# Patient Record
Sex: MALE | Race: WHITE | NOT HISPANIC OR LATINO | URBAN - NONMETROPOLITAN AREA
[De-identification: names, ages, dates, MRNs, and addresses within clinical notes are randomized per-mention and may not be internally consistent; named-entity substitution may affect disease eponyms.]

---

## 2019-06-13 ENCOUNTER — OFFICE VISIT (OUTPATIENT)
Dept: UROLOGY | Facility: CLINIC | Age: 49
End: 2019-06-13

## 2019-06-13 VITALS — HEIGHT: 72 IN | WEIGHT: 315 LBS | BODY MASS INDEX: 42.66 KG/M2

## 2019-06-13 DIAGNOSIS — N62 PSEUDOGYNECOMASTIA: ICD-10-CM

## 2019-06-13 DIAGNOSIS — E28.0 HYPERESTROGENISM: ICD-10-CM

## 2019-06-13 DIAGNOSIS — R79.89 LOW TESTOSTERONE IN MALE: Primary | ICD-10-CM

## 2019-06-13 PROCEDURE — 99204 OFFICE O/P NEW MOD 45 MIN: CPT | Performed by: UROLOGY

## 2019-06-13 RX ORDER — ANASTROZOLE 1 MG/1
1 TABLET ORAL WEEKLY
Qty: 12 TABLET | Refills: 3 | Status: SHIPPED | OUTPATIENT
Start: 2019-06-13 | End: 2022-02-16

## 2019-06-13 RX ORDER — TESTOSTERONE CYPIONATE 200 MG/ML
INJECTION, SOLUTION INTRAMUSCULAR
Qty: 10 ML | Refills: 2 | Status: SHIPPED | OUTPATIENT
Start: 2019-06-13 | End: 2020-05-15 | Stop reason: SDUPTHER

## 2019-06-13 RX ORDER — LISINOPRIL 20 MG/1
40 TABLET ORAL DAILY
COMMUNITY
Start: 2019-06-09

## 2019-06-13 NOTE — PROGRESS NOTES
"Chief Complaint:          Chief Complaint   Patient presents with   • low testosterone     new patient        HPI:   49 y.o. male referred with low testosterone.  He has a positive JONNY-androgen deficiency in the age male questionnaire  The patient was queried regarding the androgen deficiency in the age male questionnaire.  This is a validated questionnaire that was performed on a set of 314 [place] male physicians when it was positive it correlated directly with a 94% chance of low testosterone.  Patient indicates there is a decrease in libido or sex drive, a lack of energy, Decreased  strength and endurance, a decreased \"enjoyment of life\", sad and grumpy feelings with significant difficulty maintaining erections.  He is also been a recent deterioration regarding work performance.  He has significant gout on allopurinol.  He has significant stone disease.  He has significant pseudogynecomastia.  His labs been reported indicating a low testosterone and otherwise I reviewed it extensively.  His PSA was 0.810 hemoglobin A1c of 5.7 his cholesterol profile is actually quite reasonable.  And he would like to start testosterone I taught him and his wife the technique of subcutaneous injection and I will see him back in 8 weeks and I am also going to check an estradiol level as I suspect he has significant pseudogynecomastia as a consequence of hyperestrogenism      Past Medical History:        Past Medical History:   Diagnosis Date   • Hypertension          Current Meds:     Current Outpatient Medications   Medication Sig Dispense Refill   • lisinopril (PRINIVIL,ZESTRIL) 20 MG tablet 20 mg.       No current facility-administered medications for this visit.         Allergies:      No Known Allergies     Past Surgical History:     History reviewed. No pertinent surgical history.      Social History:     Social History     Socioeconomic History   • Marital status:      Spouse name: Not on file   • Number of " children: Not on file   • Years of education: Not on file   • Highest education level: Not on file   Tobacco Use   • Smoking status: Never Smoker   • Smokeless tobacco: Never Used   Substance and Sexual Activity   • Alcohol use: No     Frequency: Never   • Drug use: No       Family History:     Family History   Problem Relation Age of Onset   • No Known Problems Father    • No Known Problems Mother        Review of Systems:     Review of Systems   Constitutional: Negative.    HENT: Negative.    Eyes: Negative.    Respiratory: Negative.    Cardiovascular: Negative.    Gastrointestinal: Negative.    Endocrine: Negative.    Musculoskeletal: Negative.    Allergic/Immunologic: Negative.    Neurological: Negative.    Hematological: Negative.    Psychiatric/Behavioral: Negative.        Physical Exam:     Physical Exam   Constitutional: He is oriented to person, place, and time. He appears well-developed and well-nourished.   HENT:   Head: Normocephalic and atraumatic.   Eyes: Conjunctivae and EOM are normal. Pupils are equal, round, and reactive to light.   Neck: Normal range of motion.   Cardiovascular: Normal rate, regular rhythm, normal heart sounds and intact distal pulses.   Pulmonary/Chest: Effort normal and breath sounds normal.   Abdominal: Soft. Bowel sounds are normal.   Genitourinary:   Genitourinary Comments: Obese with pseudogynecomastia.  Soft nontender abdomen with no organomegaly, rigidity, or tenderness.  He has normal external genitalia and uncircumcised phallus with a freely movable foreskin bilaterally descended testes without masses there is no inguinal hernias adenopathy or abnormalities he had good rectal tone and a large smooth firm prostate.  There is no nodularity or any suspicious rectal abnormalities     Musculoskeletal: Normal range of motion.   Neurological: He is alert and oriented to person, place, and time. He has normal reflexes.   Skin: Skin is warm and dry.   Psychiatric: He has a normal  mood and affect. His behavior is normal. Judgment and thought content normal.   Nursing note and vitals reviewed.      I have reviewed the following portions of the patient's history: allergies, current medications, past family history, past medical history, past social history, past surgical history, problem list and ROS and confirm it's accurate.      Procedure:       Assessment/Plan:   Low TestosteroneThis pleasant male patient presents today with signs and symptoms that are consistent with low testosterone he has positive Catalino questionnaire by history this includes both the sexual and nonsexual side effects.  Sexual side effects include inability to achieve and maintain an erection, in ability to maintain his erection and decreased interest and sexual activity.  Nonsexual symptomatology includes fatigue, difficulty completing a job, tiredness.  He has a discussion of the various forms testosterone available including parenteral, topical, and the form of a patch.  We discussed the efficacy of the gels, and the injections.  As well as the cost and benefits analysis.  We discussed the the studies a talked about heart disease and its effect on prostate cancer both of which are negligible.  He gives verbal consent to proceed with treatment.  He understands the risks and benefits of length he also completed his attempts at fertility he understands the partial effect on spermatogenesis  Hyperestrogenism-check estradiol level potentially start anastrozole  Pseudogynecomastia-secondary to hyperestrogenism      .      Patient's Body mass index is 44.08 kg/m². BMI is above normal parameters. Recommendations include: educational material.              This document has been electronically signed by MYRON SULLIVAN MD June 13, 2019 10:00 AM

## 2019-06-14 PROBLEM — N62 PSEUDOGYNECOMASTIA: Status: ACTIVE | Noted: 2019-06-14

## 2019-06-14 PROBLEM — E28.0 HYPERESTROGENISM: Status: ACTIVE | Noted: 2019-06-14

## 2019-06-14 PROBLEM — R79.89 LOW TESTOSTERONE IN MALE: Status: ACTIVE | Noted: 2019-06-14

## 2019-08-08 ENCOUNTER — OFFICE VISIT (OUTPATIENT)
Dept: UROLOGY | Facility: CLINIC | Age: 49
End: 2019-08-08

## 2019-08-08 VITALS — HEIGHT: 73 IN | BODY MASS INDEX: 41.75 KG/M2 | WEIGHT: 315 LBS

## 2019-08-08 DIAGNOSIS — R79.89 LOW TESTOSTERONE IN MALE: ICD-10-CM

## 2019-08-08 DIAGNOSIS — E28.0 HYPERESTROGENISM: ICD-10-CM

## 2019-08-08 DIAGNOSIS — E29.1 HYPOGONADISM IN MALE: Primary | ICD-10-CM

## 2019-08-08 LAB
DEPRECATED RDW RBC AUTO: 45.1 FL (ref 37–54)
ERYTHROCYTE [DISTWIDTH] IN BLOOD BY AUTOMATED COUNT: 13.4 % (ref 12.3–15.4)
ESTRADIOL SERPL HS-MCNC: 21.2 PG/ML
HCT VFR BLD AUTO: 52.6 % (ref 37.5–51)
HGB BLD-MCNC: 16.8 G/DL (ref 13–17.7)
MCH RBC QN AUTO: 29 PG (ref 26.6–33)
MCHC RBC AUTO-ENTMCNC: 31.9 G/DL (ref 31.5–35.7)
MCV RBC AUTO: 90.7 FL (ref 79–97)
PLATELET # BLD AUTO: 265 10*3/MM3 (ref 140–450)
PMV BLD AUTO: 10.4 FL (ref 6–12)
PSA SERPL-MCNC: 1.16 NG/ML (ref 0–4)
RBC # BLD AUTO: 5.8 10*6/MM3 (ref 4.14–5.8)
TESTOST SERPL-MCNC: 393 NG/DL (ref 249–836)
WBC NRBC COR # BLD: 8.8 10*3/MM3 (ref 3.4–10.8)

## 2019-08-08 PROCEDURE — 36415 COLL VENOUS BLD VENIPUNCTURE: CPT | Performed by: UROLOGY

## 2019-08-08 PROCEDURE — 82670 ASSAY OF TOTAL ESTRADIOL: CPT | Performed by: UROLOGY

## 2019-08-08 PROCEDURE — 99213 OFFICE O/P EST LOW 20 MIN: CPT | Performed by: UROLOGY

## 2019-08-08 PROCEDURE — 85027 COMPLETE CBC AUTOMATED: CPT | Performed by: UROLOGY

## 2019-08-08 PROCEDURE — 84403 ASSAY OF TOTAL TESTOSTERONE: CPT | Performed by: UROLOGY

## 2019-08-08 PROCEDURE — 84153 ASSAY OF PSA TOTAL: CPT | Performed by: UROLOGY

## 2019-08-08 NOTE — PROGRESS NOTES
"Chief Complaint:          Chief Complaint   Patient presents with   • Hypogonadism     8 week f/u       HPI:   49 y.o. male patient returns today for follow-up.  He is been on testosterone replacement therapy.  He reports a dramatic improvement in his Catalino questionnaire: -CATALINO-androgen deficiency in the age male questionnaire  The patient was queried regarding the androgen deficiency in the age male questionnaire.  This is a validated questionnaire that was performed on a set of 314 [place] male physicians when it was positive it correlated directly with a 94% chance of low testosterone.  Patient indicates there is a decrease in libido or sex drive, a lack of energy, Decreased  strength and endurance, a decreased \"enjoyment of life\", sad and grumpy feelings with significant difficulty maintaining erections.  He is also been a recent deterioration regarding work performance.  He reports weight loss.  He has good facility and the use of subcutaneous and intramuscular injections as well as comfort level and using the medication in a sterile fashion.  He understands he should use only the prescribed dose.  He's here for appropriate lab monitoring regarding this.  He understands this is a controlled substance and therefore must be watched closely will not be refilled and the medical loss or miss calculation of the dose.  He is very happy with the treatment and therefore wants to continue it.      Past Medical History:        Past Medical History:   Diagnosis Date   • Hyperestrogenism 6/14/2019   • Hypertension          Current Meds:     Current Outpatient Medications   Medication Sig Dispense Refill   • anastrozole (ARIMIDEX) 1 MG tablet Take 1 tablet by mouth 1 (One) Time Per Week. 12 tablet 3   • lisinopril (PRINIVIL,ZESTRIL) 20 MG tablet 20 mg.     • Syringe 25G X 5/8\" 3 ML misc Use as directed 2 x weekly 24 each 3   • Testosterone Cypionate (DEPO-TESTOSTERONE) 200 MG/ML injection Inject 1/2 cc subcutaneously every " Monday and Thursday 10 mL 2     No current facility-administered medications for this visit.         Allergies:      No Known Allergies     Past Surgical History:     History reviewed. No pertinent surgical history.      Social History:     Social History     Socioeconomic History   • Marital status:      Spouse name: Not on file   • Number of children: Not on file   • Years of education: Not on file   • Highest education level: Not on file   Tobacco Use   • Smoking status: Never Smoker   • Smokeless tobacco: Never Used   Substance and Sexual Activity   • Alcohol use: No     Frequency: Never   • Drug use: No       Family History:     Family History   Problem Relation Age of Onset   • No Known Problems Father    • No Known Problems Mother        Review of Systems:     Review of Systems   Constitutional: Negative.    HENT: Negative.    Eyes: Negative.    Respiratory: Negative.    Cardiovascular: Negative.    Gastrointestinal: Negative.    Endocrine: Negative.    Musculoskeletal: Negative.    Allergic/Immunologic: Negative.    Neurological: Negative.    Hematological: Negative.    Psychiatric/Behavioral: Negative.        Physical Exam:     Physical Exam   Constitutional: He is oriented to person, place, and time. He appears well-developed and well-nourished.   HENT:   Head: Normocephalic and atraumatic.   Eyes: Conjunctivae and EOM are normal. Pupils are equal, round, and reactive to light.   Neck: Normal range of motion.   Cardiovascular: Normal rate, regular rhythm, normal heart sounds and intact distal pulses.   Pulmonary/Chest: Effort normal and breath sounds normal.   Abdominal: Soft. Bowel sounds are normal.   Musculoskeletal: Normal range of motion.   Neurological: He is alert and oriented to person, place, and time. He has normal reflexes.   Skin: Skin is warm and dry.   Psychiatric: He has a normal mood and affect. His behavior is normal. Judgment and thought content normal.   Nursing note and vitals  reviewed.            Assessment/Plan:   Low testosterone:  patient is here for follow-up.  Since beginning the medication he's been very pleased.  He reports a dramatic improvement in his erections, ability to achieve and maintain an erection, improvement in libido, increase in frequency of morning erections, a noticeable weight loss consistent with the treatment.  No development of breast problems or abnormalities.  He's going to have appropriate safety laboratory parameters checked.   He understands that the new data implicates testosterone with the development of prostate cancer and this is all but been disproven and the medical literature as well as the risks of cardiovascular disease which is actually also been disproven.  He understands that while he is a candidate for topical therapy if he is in contact with children this is not an option because it's been shown to accentuate genitalia development at an early age that this frequently irreversible.  He also understands this is a controlled substance and as such will not be prescribed without appropriate follow-up and appropriate laboratory investigation.  He understands effects on spermatogenesis including the fact that this is not always completely reversible and not always completely limited his ability to father a child.  He has demonstrated facility in the technique of both intramuscular and subcutaneous injection.  And has been taught sterility one drawing up the medication.  Hyperestrogenism-we spoke about the role of estrogen metabolism and breakdown in the  presence of testosterone replacement therapy.  We spoke about how high estradiol levels can interfere with the improvement noted in a man on testosterone as well as significant side effects such as pseudogynecomastia.  We discussed the use of the medication arimidex using a very judicious low-dose fashion to prevent too low of an estradiol which would precipitate bone  complications.            Patient's There is no height or weight on file to calculate BMI. BMI is above normal parameters. Recommendations include: educational material.              This document has been electronically signed by MYRON SULLIVAN MD August 8, 2019 9:50 AM

## 2020-05-05 ENCOUNTER — TELEPHONE (OUTPATIENT)
Dept: UROLOGY | Facility: CLINIC | Age: 50
End: 2020-05-05

## 2020-05-15 ENCOUNTER — TELEMEDICINE (OUTPATIENT)
Dept: UROLOGY | Facility: CLINIC | Age: 50
End: 2020-05-15

## 2020-05-15 DIAGNOSIS — R79.89 LOW TESTOSTERONE IN MALE: ICD-10-CM

## 2020-05-15 PROCEDURE — 99213 OFFICE O/P EST LOW 20 MIN: CPT | Performed by: UROLOGY

## 2020-05-15 RX ORDER — TESTOSTERONE CYPIONATE 200 MG/ML
INJECTION, SOLUTION INTRAMUSCULAR
Qty: 10 ML | Refills: 2 | Status: SHIPPED | OUTPATIENT
Start: 2020-05-15 | End: 2022-02-16

## 2020-05-15 NOTE — PROGRESS NOTES
"Chief Complaint:          Testosterone replacement therapy    HPI:   50 y.o. male.    You have chosen to receive care through a telehealth visit.  Patient returns today for follow-up.  He is been on testosterone replacement therapy.  He reports a dramatic improvement in his Catalino questionnaire: -CATALINO-androgen deficiency in the age male questionnaire  The patient was queried regarding the androgen deficiency in the age male questionnaire.  This is a validated questionnaire that was performed on a set of 314 [place] male physicians when it was positive it correlated directly with a 94% chance of low testosterone.  Patient indicates there is a decrease in libido or sex drive, a lack of energy, Decreased  strength and endurance, a decreased \"enjoyment of life\", sad and grumpy feelings with significant difficulty maintaining erections.  He is also been a recent deterioration regarding work performance.  He reports weight loss.  He has good facility and the use of subcutaneous and intramuscular injections as well as comfort level and using the medication in a sterile fashion.  He understands he should use only the prescribed dose.  He's here for appropriate lab monitoring regarding this.  He understands this is a controlled substance and therefore must be watched closely will not be refilled and the medical loss or miss calculation of the dose.  He is very happy with the treatment and therefore wants to continue it.  Do you consent to use a video/audio connection for your medical care today? Yes   Past Medical History:        Past Medical History:   Diagnosis Date   • Hyperestrogenism 6/14/2019   • Hypertension          Current Meds:     Current Outpatient Medications   Medication Sig Dispense Refill   • anastrozole (ARIMIDEX) 1 MG tablet Take 1 tablet by mouth 1 (One) Time Per Week. 12 tablet 3   • lisinopril (PRINIVIL,ZESTRIL) 20 MG tablet 20 mg.     • Syringe 25G X 5/8\" 3 ML misc Use as directed 2 x weekly 24 each 3   "   • Testosterone Cypionate (DEPO-TESTOSTERONE) 200 MG/ML injection Inject 1/2 cc subcutaneously every Monday and Thursday 10 mL 2     No current facility-administered medications for this visit.         Allergies:      No Known Allergies     Past Surgical History:     No past surgical history on file.      Social History:     Social History     Socioeconomic History   • Marital status:      Spouse name: Not on file   • Number of children: Not on file   • Years of education: Not on file   • Highest education level: Not on file   Tobacco Use   • Smoking status: Never Smoker   • Smokeless tobacco: Never Used   Substance and Sexual Activity   • Alcohol use: No     Frequency: Never   • Drug use: No       Family History:     Family History   Problem Relation Age of Onset   • No Known Problems Father    • No Known Problems Mother        Review of Systems:     Review of Systems   Constitutional: Negative.    HENT: Negative.    Eyes: Negative.    Respiratory: Negative.    Cardiovascular: Negative.    Gastrointestinal: Negative.    Endocrine: Negative.    Musculoskeletal: Negative.    Allergic/Immunologic: Negative.    Neurological: Negative.    Hematological: Negative.    Psychiatric/Behavioral: Negative.        Physical Exam:     Physical Exam   Constitutional: He is oriented to person, place, and time.   Neurological: He is alert and oriented to person, place, and time.   Psychiatric: He has a normal mood and affect. His behavior is normal. Judgment and thought content normal.       I have reviewed the following portions of the patient's history: allergies, current medications, past family history, past medical history, past social history, past surgical history, problem list and ROS and confirm it's accurate.      Procedure:       Assessment/Plan:   Low testosterone:  patient is here for follow-up.  Since beginning the medication he's been very pleased.  He reports a dramatic improvement in his erections, ability  to achieve and maintain an erection, improvement in libido, increase in frequency of morning erections, a noticeable weight loss consistent with the treatment.  No development of breast problems or abnormalities.  He's going to have appropriate safety laboratory parameters checked.   He understands that the new data implicates testosterone with the development of prostate cancer and this is all but been disproven and the medical literature as well as the risks of cardiovascular disease which is actually also been disproven.  He understands that while he is a candidate for topical therapy if he is in contact with children this is not an option because it's been shown to accentuate genitalia development at an early age that this frequently irreversible.  He also understands this is a controlled substance and as such will not be prescribed without appropriate follow-up and appropriate laboratory investigation.  He understands effects on spermatogenesis including the fact that this is not always completely reversible and not always completely limited his ability to father a child.  He has demonstrated facility in the technique of both intramuscular and subcutaneous injection.  And has been taught sterility one drawing up the medication.      .      Patient's There is no height or weight on file to calculate BMI. BMI is above normal parameters. Recommendations include: educational material.    This was an audio and video enabled telemedicine encounter. Time 7 minutes        This document has been electronically signed by MYRON SULLIVAN MD May 15, 2020 14:47

## 2020-06-15 ENCOUNTER — TELEPHONE (OUTPATIENT)
Dept: UROLOGY | Facility: CLINIC | Age: 50
End: 2020-06-15

## 2020-06-15 DIAGNOSIS — R79.89 LOW TESTOSTERONE IN MALE: Primary | ICD-10-CM

## 2020-06-30 ENCOUNTER — TRANSCRIBE ORDERS (OUTPATIENT)
Dept: ADMINISTRATIVE | Facility: HOSPITAL | Age: 50
End: 2020-06-30

## 2020-06-30 DIAGNOSIS — K43.9 EPIGASTRIC HERNIA: Primary | ICD-10-CM

## 2020-06-30 DIAGNOSIS — R10.9 ABDOMINAL PAIN, UNSPECIFIED ABDOMINAL LOCATION: ICD-10-CM

## 2020-07-01 ENCOUNTER — HOSPITAL ENCOUNTER (OUTPATIENT)
Dept: ULTRASOUND IMAGING | Facility: HOSPITAL | Age: 50
Discharge: HOME | End: 2020-07-01
Admitting: PHYSICIAN ASSISTANT

## 2020-07-01 DIAGNOSIS — R10.9 ABDOMINAL PAIN, UNSPECIFIED ABDOMINAL LOCATION: ICD-10-CM

## 2020-07-01 DIAGNOSIS — K43.9 EPIGASTRIC HERNIA: ICD-10-CM

## 2020-07-01 PROCEDURE — 76700 US EXAM ABDOM COMPLETE: CPT

## 2020-07-01 PROCEDURE — 76700 US EXAM ABDOM COMPLETE: CPT | Performed by: RADIOLOGY

## 2021-03-18 ENCOUNTER — BULK ORDERING (OUTPATIENT)
Dept: CASE MANAGEMENT | Facility: OTHER | Age: 51
End: 2021-03-18

## 2021-03-18 DIAGNOSIS — Z23 IMMUNIZATION DUE: ICD-10-CM

## 2021-07-15 ENCOUNTER — TRANSCRIBE ORDERS (OUTPATIENT)
Dept: ADMINISTRATIVE | Facility: HOSPITAL | Age: 51
End: 2021-07-15

## 2021-07-15 ENCOUNTER — HOSPITAL ENCOUNTER (OUTPATIENT)
Dept: GENERAL RADIOLOGY | Facility: HOSPITAL | Age: 51
Discharge: HOME | End: 2021-07-15
Admitting: PHYSICIAN ASSISTANT

## 2021-07-15 DIAGNOSIS — M79.672 LEFT FOOT PAIN: ICD-10-CM

## 2021-07-15 DIAGNOSIS — M79.671 RIGHT FOOT PAIN: Primary | ICD-10-CM

## 2021-07-15 DIAGNOSIS — M79.671 RIGHT FOOT PAIN: ICD-10-CM

## 2021-07-15 PROCEDURE — 73630 X-RAY EXAM OF FOOT: CPT

## 2021-07-15 PROCEDURE — 73630 X-RAY EXAM OF FOOT: CPT | Performed by: RADIOLOGY

## 2022-01-27 ENCOUNTER — OFFICE VISIT (OUTPATIENT)
Dept: SURGERY | Facility: CLINIC | Age: 52
End: 2022-01-27

## 2022-01-27 VITALS
BODY MASS INDEX: 41.75 KG/M2 | SYSTOLIC BLOOD PRESSURE: 138 MMHG | HEIGHT: 73 IN | DIASTOLIC BLOOD PRESSURE: 88 MMHG | WEIGHT: 315 LBS

## 2022-01-27 DIAGNOSIS — K80.20 GALLSTONES: Primary | ICD-10-CM

## 2022-01-27 DIAGNOSIS — R19.5 POSITIVE COLORECTAL CANCER SCREENING USING COLOGUARD TEST: ICD-10-CM

## 2022-01-27 PROCEDURE — 99203 OFFICE O/P NEW LOW 30 MIN: CPT | Performed by: SURGERY

## 2022-01-27 RX ORDER — ALLOPURINOL 300 MG/1
300 TABLET ORAL DAILY
COMMUNITY

## 2022-01-27 RX ORDER — SODIUM, POTASSIUM,MAG SULFATES 17.5-3.13G
SOLUTION, RECONSTITUTED, ORAL ORAL
Qty: 175 ML | Refills: 0 | Status: SHIPPED | OUTPATIENT
Start: 2022-01-27 | End: 2022-02-24

## 2022-01-27 RX ORDER — INDOMETHACIN 25 MG/1
25 CAPSULE ORAL AS NEEDED
COMMUNITY

## 2022-01-27 NOTE — PROGRESS NOTES
Subjective   Daniele Phelps is a 51 y.o. male.     Chief Complaint: positive cologuard    History of Present Illness He had a cologuard test that came back positive. No symptoms or bleeding.    The following portions of the patient's history were reviewed and updated as appropriate: current medications, past family history, past medical history, past social history, past surgical history and problem list.    Review of Systems   Constitutional: Negative for activity change, appetite change, chills, fever and unexpected weight change.   HENT: Negative for congestion, facial swelling and sore throat.    Eyes: Negative for photophobia and visual disturbance.   Respiratory: Negative for chest tightness, shortness of breath and wheezing.    Cardiovascular: Negative for chest pain, palpitations and leg swelling.   Gastrointestinal: Negative for abdominal distention, abdominal pain, anal bleeding, blood in stool, constipation, diarrhea, nausea, rectal pain and vomiting.   Endocrine: Negative for cold intolerance, heat intolerance, polydipsia and polyuria.   Genitourinary: Negative for difficulty urinating, dysuria, flank pain and urgency.   Musculoskeletal: Negative for back pain and myalgias.   Skin: Negative for rash and wound.   Allergic/Immunologic: Negative for immunocompromised state.   Neurological: Negative for dizziness, seizures, syncope, light-headedness, numbness and headaches.   Hematological: Negative for adenopathy. Does not bruise/bleed easily.   Psychiatric/Behavioral: Negative for behavioral problems and confusion. The patient is not nervous/anxious.        Objective   Physical Exam  Vitals reviewed.   Constitutional:       General: He is not in acute distress.     Appearance: He is well-developed. He is not ill-appearing.   HENT:      Head: Normocephalic. No laceration. Hair is normal.      Right Ear: Hearing and ear canal normal.      Left Ear: Hearing and ear canal normal.      Nose: Nose normal.       Right Sinus: No maxillary sinus tenderness or frontal sinus tenderness.      Left Sinus: No maxillary sinus tenderness or frontal sinus tenderness.   Eyes:      General: Lids are normal.      Conjunctiva/sclera: Conjunctivae normal.      Pupils: Pupils are equal, round, and reactive to light.   Neck:      Thyroid: No thyroid mass or thyromegaly.      Vascular: No JVD.      Trachea: No tracheal tenderness or tracheal deviation.   Cardiovascular:      Rate and Rhythm: Normal rate and regular rhythm.      Heart sounds: No murmur heard.  No gallop.    Pulmonary:      Effort: Pulmonary effort is normal.      Breath sounds: Normal breath sounds. No stridor. No wheezing.   Chest:      Chest wall: No tenderness.   Breasts:      Right: No supraclavicular adenopathy.      Left: No supraclavicular adenopathy.       Abdominal:      General: Bowel sounds are normal. There is no distension.      Palpations: Abdomen is soft. There is no mass.      Tenderness: There is no abdominal tenderness. There is no guarding or rebound.      Hernia: No hernia is present.   Musculoskeletal:         General: No deformity.      Cervical back: Normal range of motion.   Lymphadenopathy:      Cervical: No cervical adenopathy.      Upper Body:      Right upper body: No supraclavicular adenopathy.      Left upper body: No supraclavicular adenopathy.   Skin:     General: Skin is warm and dry.      Coloration: Skin is not pale.      Findings: No erythema or rash.   Neurological:      Mental Status: He is alert and oriented to person, place, and time.      Motor: No abnormal muscle tone.   Psychiatric:         Behavior: Behavior normal.         Thought Content: Thought content normal.         Past Medical History:   Diagnosis Date   • Hyperestrogenism 6/14/2019   • Hypertension        Family History   Problem Relation Age of Onset   • No Known Problems Father    • No Known Problems Mother        Social History     Tobacco Use   • Smoking status: Never  "Smoker   • Smokeless tobacco: Never Used   Substance Use Topics   • Alcohol use: No   • Drug use: No       No past surgical history on file.    Current Outpatient Medications   Medication Instructions   • allopurinol (ZYLOPRIM) 300 mg, Oral, Daily   • anastrozole (ARIMIDEX) 1 mg, Oral, Weekly   • indomethacin (INDOCIN) 25 mg, Oral, As Needed   • lisinopril (PRINIVIL,ZESTRIL) 40 mg, Oral, Daily   • Syringe 25G X 5/8\" 3 ML misc Use as directed 2 x weekly   • Testosterone Cypionate (Depo-Testosterone) 200 MG/ML injection Inject 1/2 cc subcutaneously every Monday and Thursday         Assessment/Plan     Positive cologuard     colonoscopy  "

## 2022-01-28 ENCOUNTER — TELEPHONE (OUTPATIENT)
Dept: SURGERY | Facility: CLINIC | Age: 52
End: 2022-01-28

## 2022-01-28 PROBLEM — K80.20 GALLSTONES: Status: ACTIVE | Noted: 2022-01-28

## 2022-01-28 NOTE — TELEPHONE ENCOUNTER
Covid test Wed 2/2/22 between 8 am and 4 pm  Clear liquid diet all day Thursday 2/3 and bowel prep that evening beginning around 6 pm.  NPO for surgery and have a  with him  Surgery Friday 2/4 @ 7 am

## 2022-02-10 ENCOUNTER — TELEPHONE (OUTPATIENT)
Dept: SURGERY | Facility: CLINIC | Age: 52
End: 2022-02-10

## 2022-02-10 NOTE — TELEPHONE ENCOUNTER
Drive thru Covid test Tuesday 2/15/22 between the hours of 8 am to 4 pm. (Follow the orange arrows)    Clear liquid diet all day Wed 2/16 beginning from the time he wakes till midnight. Patient to begin drinking bowel prep on Wed 2/16 around 6 pm.     NPO for surgery and patient to have a  with him on day of surgery.    Colonoscopy Thursday 2/17 @ 6:30 am. Arrive at outpatient surgery.      
Detail Level: Zone
Detail Level: Detailed

## 2022-02-15 ENCOUNTER — LAB (OUTPATIENT)
Dept: LAB | Facility: HOSPITAL | Age: 52
End: 2022-02-15

## 2022-02-15 DIAGNOSIS — K80.20 GALLSTONES: ICD-10-CM

## 2022-02-15 DIAGNOSIS — R19.5 POSITIVE COLORECTAL CANCER SCREENING USING COLOGUARD TEST: ICD-10-CM

## 2022-02-15 LAB
FLUAV RNA RESP QL NAA+PROBE: NOT DETECTED
FLUBV RNA RESP QL NAA+PROBE: NOT DETECTED
SARS-COV-2 RNA RESP QL NAA+PROBE: NOT DETECTED

## 2022-02-15 PROCEDURE — 87636 SARSCOV2 & INF A&B AMP PRB: CPT | Performed by: SURGERY

## 2022-02-16 ENCOUNTER — ANESTHESIA EVENT (OUTPATIENT)
Dept: PERIOP | Facility: HOSPITAL | Age: 52
End: 2022-02-16

## 2022-02-17 ENCOUNTER — ANESTHESIA (OUTPATIENT)
Dept: PERIOP | Facility: HOSPITAL | Age: 52
End: 2022-02-17

## 2022-02-17 ENCOUNTER — HOSPITAL ENCOUNTER (OUTPATIENT)
Facility: HOSPITAL | Age: 52
Setting detail: HOSPITAL OUTPATIENT SURGERY
Discharge: HOME | End: 2022-02-17
Attending: SURGERY | Admitting: SURGERY

## 2022-02-17 VITALS
BODY MASS INDEX: 42.66 KG/M2 | HEART RATE: 74 BPM | RESPIRATION RATE: 20 BRPM | SYSTOLIC BLOOD PRESSURE: 146 MMHG | WEIGHT: 315 LBS | OXYGEN SATURATION: 98 % | HEIGHT: 72 IN | TEMPERATURE: 97.7 F | DIASTOLIC BLOOD PRESSURE: 94 MMHG

## 2022-02-17 DIAGNOSIS — R19.5 POSITIVE COLORECTAL CANCER SCREENING USING COLOGUARD TEST: ICD-10-CM

## 2022-02-17 DIAGNOSIS — K80.20 GALLSTONES: ICD-10-CM

## 2022-02-17 PROCEDURE — 25010000002 FENTANYL CITRATE (PF) 50 MCG/ML SOLUTION: Performed by: NURSE ANESTHETIST, CERTIFIED REGISTERED

## 2022-02-17 PROCEDURE — 45385 COLONOSCOPY W/LESION REMOVAL: CPT | Performed by: SURGERY

## 2022-02-17 PROCEDURE — 25010000002 PROPOFOL 10 MG/ML EMULSION: Performed by: NURSE ANESTHETIST, CERTIFIED REGISTERED

## 2022-02-17 RX ORDER — DROPERIDOL 2.5 MG/ML
0.62 INJECTION, SOLUTION INTRAMUSCULAR; INTRAVENOUS ONCE AS NEEDED
Status: DISCONTINUED | OUTPATIENT
Start: 2022-02-17 | End: 2022-02-17 | Stop reason: HOSPADM

## 2022-02-17 RX ORDER — SODIUM CHLORIDE 0.9 % (FLUSH) 0.9 %
10 SYRINGE (ML) INJECTION EVERY 12 HOURS SCHEDULED
Status: DISCONTINUED | OUTPATIENT
Start: 2022-02-17 | End: 2022-02-17 | Stop reason: HOSPADM

## 2022-02-17 RX ORDER — MIDAZOLAM HYDROCHLORIDE 1 MG/ML
1 INJECTION INTRAMUSCULAR; INTRAVENOUS
Status: DISCONTINUED | OUTPATIENT
Start: 2022-02-17 | End: 2022-02-17 | Stop reason: HOSPADM

## 2022-02-17 RX ORDER — SODIUM CHLORIDE 0.9 % (FLUSH) 0.9 %
10 SYRINGE (ML) INJECTION AS NEEDED
Status: DISCONTINUED | OUTPATIENT
Start: 2022-02-17 | End: 2022-02-17 | Stop reason: HOSPADM

## 2022-02-17 RX ORDER — OXYCODONE HYDROCHLORIDE AND ACETAMINOPHEN 5; 325 MG/1; MG/1
1 TABLET ORAL ONCE AS NEEDED
Status: DISCONTINUED | OUTPATIENT
Start: 2022-02-17 | End: 2022-02-17 | Stop reason: HOSPADM

## 2022-02-17 RX ORDER — SODIUM CHLORIDE, SODIUM LACTATE, POTASSIUM CHLORIDE, CALCIUM CHLORIDE 600; 310; 30; 20 MG/100ML; MG/100ML; MG/100ML; MG/100ML
100 INJECTION, SOLUTION INTRAVENOUS ONCE AS NEEDED
Status: DISCONTINUED | OUTPATIENT
Start: 2022-02-17 | End: 2022-02-17 | Stop reason: HOSPADM

## 2022-02-17 RX ORDER — FENTANYL CITRATE 50 UG/ML
50 INJECTION, SOLUTION INTRAMUSCULAR; INTRAVENOUS
Status: DISCONTINUED | OUTPATIENT
Start: 2022-02-17 | End: 2022-02-17 | Stop reason: HOSPADM

## 2022-02-17 RX ORDER — ONDANSETRON 2 MG/ML
4 INJECTION INTRAMUSCULAR; INTRAVENOUS AS NEEDED
Status: DISCONTINUED | OUTPATIENT
Start: 2022-02-17 | End: 2022-02-17 | Stop reason: HOSPADM

## 2022-02-17 RX ORDER — LIDOCAINE HYDROCHLORIDE 20 MG/ML
INJECTION, SOLUTION INFILTRATION; PERINEURAL AS NEEDED
Status: DISCONTINUED | OUTPATIENT
Start: 2022-02-17 | End: 2022-02-17 | Stop reason: SURG

## 2022-02-17 RX ORDER — FENTANYL CITRATE 50 UG/ML
INJECTION, SOLUTION INTRAMUSCULAR; INTRAVENOUS AS NEEDED
Status: DISCONTINUED | OUTPATIENT
Start: 2022-02-17 | End: 2022-02-17 | Stop reason: SURG

## 2022-02-17 RX ORDER — MEPERIDINE HYDROCHLORIDE 25 MG/ML
12.5 INJECTION INTRAMUSCULAR; INTRAVENOUS; SUBCUTANEOUS
Status: DISCONTINUED | OUTPATIENT
Start: 2022-02-17 | End: 2022-02-17 | Stop reason: HOSPADM

## 2022-02-17 RX ORDER — SODIUM CHLORIDE, SODIUM LACTATE, POTASSIUM CHLORIDE, CALCIUM CHLORIDE 600; 310; 30; 20 MG/100ML; MG/100ML; MG/100ML; MG/100ML
125 INJECTION, SOLUTION INTRAVENOUS ONCE
Status: COMPLETED | OUTPATIENT
Start: 2022-02-17 | End: 2022-02-17

## 2022-02-17 RX ORDER — IPRATROPIUM BROMIDE AND ALBUTEROL SULFATE 2.5; .5 MG/3ML; MG/3ML
3 SOLUTION RESPIRATORY (INHALATION) ONCE AS NEEDED
Status: DISCONTINUED | OUTPATIENT
Start: 2022-02-17 | End: 2022-02-17 | Stop reason: HOSPADM

## 2022-02-17 RX ORDER — KETOROLAC TROMETHAMINE 30 MG/ML
30 INJECTION, SOLUTION INTRAMUSCULAR; INTRAVENOUS EVERY 6 HOURS PRN
Status: DISCONTINUED | OUTPATIENT
Start: 2022-02-17 | End: 2022-02-17 | Stop reason: HOSPADM

## 2022-02-17 RX ADMIN — PROPOFOL 180 MCG/KG/MIN: 10 INJECTION, EMULSION INTRAVENOUS at 07:19

## 2022-02-17 RX ADMIN — SODIUM CHLORIDE, POTASSIUM CHLORIDE, SODIUM LACTATE AND CALCIUM CHLORIDE: 600; 310; 30; 20 INJECTION, SOLUTION INTRAVENOUS at 07:16

## 2022-02-17 RX ADMIN — LIDOCAINE HYDROCHLORIDE 40 MG: 20 INJECTION, SOLUTION INFILTRATION; PERINEURAL at 07:18

## 2022-02-17 RX ADMIN — FENTANYL CITRATE 100 MCG: 50 INJECTION INTRAMUSCULAR; INTRAVENOUS at 07:18

## 2022-02-17 NOTE — OP NOTE
COLONOSCOPY  Procedure Note    Daniele Phelps  2/17/2022    Pre-op Diagnosis:   Gallstones [K80.20]  Positive colorectal cancer screening using Cologuard test [R19.5]    Post-op Diagnosis:  Polyps in colon       Procedure(s):  COLONOSCOPY    Surgeon(s):  Oswald Chan MD    Anesthesia: General    Staff:   Circulator: Samantha Guan, RN  Endo Technician: Zoraida Saenz    Estimated Blood Loss: none    Specimens:                Order Name Source Comment Collection Info Order Time   TISSUE PATHOLOGY EXAM Large Intestine, Cecum  Collected By: Oswald Chan MD 2/17/2022  7:30 AM     Release to patient   Immediate              Drains: * No LDAs found *    Procedure: The scope was advanced slowly from the rectum to the cecum. The prep was adequate. The ileocecal valve was normal. There were two small polyps in the cecum removed with snare and cautery. There were two more small polyps in the hepatic flexure area removed with snare and cautery, and two small ones in the descending colon close together also removed with snare and cautery. No other abnormalities seen.    Findings: polyps    Complications: none   Grafts / Implants N/A    Oswald Chan MD     Date: 2/17/2022  Time: 08:01 EST

## 2022-02-17 NOTE — ANESTHESIA POSTPROCEDURE EVALUATION
Patient: Daniele Phelps    Procedure Summary     Date: 02/17/22 Room / Location: [facility] OR  /  [facility] OR    Anesthesia Start: 0716 Anesthesia Stop: 0747    Procedure: COLONOSCOPY (N/A ) Diagnosis:       Gallstones      Positive colorectal cancer screening using Cologuard test      (Gallstones [K80.20])      (Positive colorectal cancer screening using Cologuard test [R19.5])    Surgeons: Oswald Chan MD Provider: Jose Elias Macias MD    Anesthesia Type: general ASA Status: 3          Anesthesia Type: general    Vitals  Vitals Value Taken Time   /91 02/17/22 0803   Temp 97.7 °F (36.5 °C) 02/17/22 0748   Pulse 80 02/17/22 0803   Resp 20 02/17/22 0803   SpO2 97 % 02/17/22 0803           Post Anesthesia Care and Evaluation    Patient location during evaluation: PHASE II  Patient participation: complete - patient participated  Level of consciousness: awake and alert  Pain score: 0  Pain management: adequate  Airway patency: patent  Anesthetic complications: No anesthetic complications    Cardiovascular status: acceptable  Respiratory status: acceptable  Hydration status: acceptable

## 2022-02-17 NOTE — ANESTHESIA PREPROCEDURE EVALUATION
Anesthesia Evaluation     Patient summary reviewed and Nursing notes reviewed   no history of anesthetic complications:  NPO Solid Status: > 8 hours  NPO Liquid Status: > 8 hours           Airway   Mallampati: II  TM distance: >3 FB  Neck ROM: full  Large neck circumference  Dental - normal exam     Pulmonary - negative pulmonary ROS    breath sounds clear to auscultation  Cardiovascular   Exercise tolerance: good (4-7 METS)    Rhythm: regular  Rate: normal    (+) hypertension, hyperlipidemia,       Neuro/Psych- negative ROS  GI/Hepatic/Renal/Endo    (+) obesity,       Musculoskeletal     Abdominal   (+) obese,     Abdomen: soft.   Substance History - negative use     OB/GYN negative ob/gyn ROS         Other   arthritis,                      Anesthesia Plan    ASA 3     general     intravenous induction     Anesthetic plan, all risks, benefits, and alternatives have been provided, discussed and informed consent has been obtained with: patient.    Plan discussed with CRNA.        CODE STATUS:

## 2022-02-21 LAB
LAB AP CASE REPORT: NORMAL
PATH REPORT.FINAL DX SPEC: NORMAL

## 2022-02-24 ENCOUNTER — OFFICE VISIT (OUTPATIENT)
Dept: SURGERY | Facility: CLINIC | Age: 52
End: 2022-02-24

## 2022-02-24 VITALS — HEIGHT: 72 IN | BODY MASS INDEX: 42.66 KG/M2 | WEIGHT: 315 LBS

## 2022-02-24 DIAGNOSIS — R19.5 POSITIVE COLORECTAL CANCER SCREENING USING COLOGUARD TEST: Primary | ICD-10-CM

## 2022-02-24 PROCEDURE — 99212 OFFICE O/P EST SF 10 MIN: CPT | Performed by: SURGERY

## 2022-02-24 NOTE — PROGRESS NOTES
Subjective   Daniele Phelps is a 51 y.o. male.     Chief Complaint: positive cologuard test    History of Present Illness He had some small benign polyps in the right colon. No symptoms.    The following portions of the patient's history were reviewed and updated as appropriate: current medications, past family history, past medical history, past social history, past surgical history and problem list.    Review of Systems    Objective   Physical Exam abdomen soft and not tender    Past Medical History:   Diagnosis Date   • Arthritis    • Elevated cholesterol    • Hyperestrogenism 6/14/2019   • Hypertension        Family History   Problem Relation Age of Onset   • No Known Problems Father    • No Known Problems Mother        Social History     Tobacco Use   • Smoking status: Never Smoker   • Smokeless tobacco: Never Used   Substance Use Topics   • Alcohol use: Yes     Comment: Occasional   • Drug use: No       Past Surgical History:   Procedure Laterality Date   • COLONOSCOPY N/A 2/17/2022    Procedure: COLONOSCOPY;  Surgeon: Oswald Chan MD;  Location: [facility];  Service: Gastroenterology;  Laterality: N/A;   • EXTRACORPOREAL SHOCK WAVE LITHOTRIPSY (ESWL)  2002       Current Outpatient Medications   Medication Instructions   • allopurinol (ZYLOPRIM) 300 mg, Oral, Daily   • indomethacin (INDOCIN) 25 mg, Oral, As Needed   • lisinopril (PRINIVIL,ZESTRIL) 40 mg, Oral, Daily   • Suprep Bowel Prep Kit 17.5-3.13-1.6 GM/177ML solution oral solution Dispense one Kit        Sig: Used as Directed         Assessment/Plan   Diagnoses and all orders for this visit:    1. Positive colorectal cancer screening using Cologuard test (Primary)    repeat colonoscopy in 3-5 years

## 2023-12-05 ENCOUNTER — HOSPITAL ENCOUNTER (OUTPATIENT)
Dept: GENERAL RADIOLOGY | Facility: HOSPITAL | Age: 53
Discharge: HOME | End: 2023-12-05
Admitting: NURSE PRACTITIONER
Payer: MEDICARE

## 2023-12-05 ENCOUNTER — TRANSCRIBE ORDERS (OUTPATIENT)
Dept: ADMINISTRATIVE | Facility: HOSPITAL | Age: 53
End: 2023-12-05
Payer: MEDICARE

## 2023-12-05 DIAGNOSIS — M13.0 POLYARTHROPATHY: Primary | ICD-10-CM

## 2023-12-05 DIAGNOSIS — M13.0 POLYARTHROPATHY: ICD-10-CM

## 2023-12-05 PROCEDURE — 73564 X-RAY EXAM KNEE 4 OR MORE: CPT

## 2023-12-05 PROCEDURE — 73080 X-RAY EXAM OF ELBOW: CPT

## 2023-12-05 PROCEDURE — 73610 X-RAY EXAM OF ANKLE: CPT

## 2023-12-05 PROCEDURE — 73630 X-RAY EXAM OF FOOT: CPT

## 2024-05-08 ENCOUNTER — OFFICE VISIT (OUTPATIENT)
Dept: CARDIOLOGY | Facility: CLINIC | Age: 54
End: 2024-05-08
Payer: MEDICARE

## 2024-05-08 VITALS
RESPIRATION RATE: 16 BRPM | HEART RATE: 84 BPM | SYSTOLIC BLOOD PRESSURE: 133 MMHG | DIASTOLIC BLOOD PRESSURE: 80 MMHG | WEIGHT: 315 LBS | OXYGEN SATURATION: 94 % | BODY MASS INDEX: 42.66 KG/M2 | HEIGHT: 72 IN

## 2024-05-08 DIAGNOSIS — I10 ESSENTIAL HYPERTENSION: ICD-10-CM

## 2024-05-08 DIAGNOSIS — R06.09 DYSPNEA ON EXERTION: Primary | ICD-10-CM

## 2024-05-08 PROCEDURE — 93000 ELECTROCARDIOGRAM COMPLETE: CPT | Performed by: INTERNAL MEDICINE

## 2024-05-08 PROCEDURE — 99204 OFFICE O/P NEW MOD 45 MIN: CPT | Performed by: INTERNAL MEDICINE

## 2024-05-08 RX ORDER — FEBUXOSTAT 40 MG/1
40 TABLET, FILM COATED ORAL DAILY
COMMUNITY

## 2024-05-08 RX ORDER — SEMAGLUTIDE 1.34 MG/ML
INJECTION, SOLUTION SUBCUTANEOUS WEEKLY
COMMUNITY

## 2024-05-08 RX ORDER — IBUPROFEN 800 MG/1
800 TABLET ORAL EVERY 6 HOURS PRN
COMMUNITY

## 2024-05-08 RX ORDER — NABUMETONE 500 MG/1
500 TABLET, FILM COATED ORAL 2 TIMES DAILY PRN
COMMUNITY

## 2024-05-08 RX ORDER — CITALOPRAM 20 MG/1
20 TABLET ORAL DAILY
COMMUNITY

## 2024-05-08 RX ORDER — COLCHICINE 0.6 MG/1
0.6 TABLET ORAL DAILY
COMMUNITY

## 2024-06-04 ENCOUNTER — HOSPITAL ENCOUNTER (OUTPATIENT)
Dept: CARDIOLOGY | Facility: HOSPITAL | Age: 54
Discharge: HOME | End: 2024-06-04
Payer: MEDICARE

## 2024-06-04 DIAGNOSIS — R06.09 DYSPNEA ON EXERTION: ICD-10-CM

## 2024-06-04 PROCEDURE — 93306 TTE W/DOPPLER COMPLETE: CPT

## 2024-06-08 LAB
BH CV ECHO MEAS - AO MAX PG: 4 MMHG
BH CV ECHO MEAS - AO MEAN PG: 3 MMHG
BH CV ECHO MEAS - AO ROOT DIAM: 3.5 CM
BH CV ECHO MEAS - AO V2 MAX: 100 CM/SEC
BH CV ECHO MEAS - AO V2 VTI: 20.2 CM
BH CV ECHO MEAS - EDV(CUBED): 37.9 ML
BH CV ECHO MEAS - ESV(CUBED): 27.5 ML
BH CV ECHO MEAS - FS: 10.1 %
BH CV ECHO MEAS - IVS/LVPW: 1.18 CM
BH CV ECHO MEAS - IVSD: 1.65 CM
BH CV ECHO MEAS - LA DIMENSION: 3 CM
BH CV ECHO MEAS - LAT PEAK E' VEL: 10 CM/SEC
BH CV ECHO MEAS - LV MASS(C)D: 188.2 GRAMS
BH CV ECHO MEAS - LVIDD: 3.4 CM
BH CV ECHO MEAS - LVIDS: 3 CM
BH CV ECHO MEAS - LVOT AREA: 4.9 CM2
BH CV ECHO MEAS - LVOT DIAM: 2.5 CM
BH CV ECHO MEAS - LVPWD: 1.4 CM
BH CV ECHO MEAS - MED PEAK E' VEL: 6.4 CM/SEC
BH CV ECHO MEAS - MV A MAX VEL: 72.8 CM/SEC
BH CV ECHO MEAS - MV E MAX VEL: 64 CM/SEC
BH CV ECHO MEAS - MV E/A: 0.88
BH CV ECHO MEAS - PA ACC TIME: 0.06 SEC
BH CV ECHO MEASUREMENTS AVERAGE E/E' RATIO: 7.8

## 2024-06-18 ENCOUNTER — TELEPHONE (OUTPATIENT)
Dept: CARDIOLOGY | Facility: CLINIC | Age: 54
End: 2024-06-18
Payer: MEDICARE

## 2024-06-18 ENCOUNTER — OFFICE VISIT (OUTPATIENT)
Dept: CARDIOLOGY | Facility: CLINIC | Age: 54
End: 2024-06-18
Payer: MEDICARE

## 2024-06-18 VITALS
BODY MASS INDEX: 42.66 KG/M2 | SYSTOLIC BLOOD PRESSURE: 134 MMHG | WEIGHT: 315 LBS | HEIGHT: 72 IN | HEART RATE: 88 BPM | OXYGEN SATURATION: 96 % | DIASTOLIC BLOOD PRESSURE: 88 MMHG

## 2024-06-18 DIAGNOSIS — R06.09 DYSPNEA ON EXERTION: ICD-10-CM

## 2024-06-18 DIAGNOSIS — G47.30 SLEEP APNEA, UNSPECIFIED TYPE: Primary | ICD-10-CM

## 2024-06-18 PROCEDURE — 99214 OFFICE O/P EST MOD 30 MIN: CPT | Performed by: PHYSICIAN ASSISTANT

## 2024-06-18 NOTE — TELEPHONE ENCOUNTER
Called PCP and LM with medical records to fax labs.       ----- Message from Jenaro Michele sent at 6/18/2024  1:59 PM EDT -----  Can we get labs from pcp

## 2024-06-18 NOTE — PROGRESS NOTES
Debbie Leach PA  Daniele Phelps  1970 06/18/2024    Patient Active Problem List   Diagnosis    Low testosterone in male    Pseudogynecomastia    Hyperestrogenism    Positive colorectal cancer screening using Cologuard test    Gallstones       Dear Debbie Leach PA:    Subjective     History of Present Illness:    Chief Complaint   Patient presents with    Results     ECHO       Daniele Phelps is a pleasant 54 y.o. male with a past medical history significant for essential hypertension, diastolic dysfunction, diabetes mellitus.  He also has family history of coronary artery disease.  He comes in today for cardiology follow-up.    Fan did have echocardiogram performed which showed normal LVEF with grade 1 diastolic dysfunction and trace mitral valve regurgitation.  Clinically Fan still denies any chest pains but does report exertional shortness of breath he is able to do his normal actives of daily living such as grocery shopping or walking however if he has to walk up a hill or perform manual labor he will have to stop after a few minutes to catch his breath.  He denies any orthopnea or PND.  Blood pressure has been controlled recently he has been trying to lose weight      No Known Allergies:      Current Outpatient Medications:     citalopram (CeleXA) 20 MG tablet, Take 1 tablet by mouth Daily., Disp: , Rfl:     colchicine 0.6 MG tablet, Take 1 tablet by mouth Daily., Disp: , Rfl:     febuxostat (ULORIC) 40 MG tablet, Take 1 tablet by mouth Daily., Disp: , Rfl:     ibuprofen (ADVIL,MOTRIN) 800 MG tablet, Take 1 tablet by mouth Every 6 (Six) Hours As Needed for Mild Pain., Disp: , Rfl:     lisinopril (PRINIVIL,ZESTRIL) 40 MG tablet, Take 1 tablet by mouth Daily., Disp: , Rfl:     nabumetone (RELAFEN) 500 MG tablet, Take 1 tablet by mouth 2 (Two) Times a Day As Needed for Mild Pain., Disp: , Rfl:     Semaglutide, 1 MG/DOSE, (Ozempic, 1 MG/DOSE,) 2 MG/1.5ML solution pen-injector, Inject   "under the skin into the appropriate area as directed 1 (One) Time Per Week., Disp: , Rfl:     Testosterone Undecanoate 200 MG capsule, Take  by mouth. (Patient not taking: Reported on 6/18/2024), Disp: , Rfl:     The following portions of the patient's history were reviewed and updated as appropriate: allergies, current medications, past family history, past medical history, past social history, past surgical history and problem list.    Social History     Tobacco Use    Smoking status: Never    Smokeless tobacco: Never   Vaping Use    Vaping status: Never Used   Substance Use Topics    Alcohol use: Yes     Comment: Occasional    Drug use: No         Objective   Vitals:    06/18/24 1335   BP: 134/88   Pulse: 88   SpO2: 96%   Weight: (!) 172 kg (378 lb 6.4 oz)   Height: 182.9 cm (72\")     Body mass index is 51.32 kg/m².    ROS    Constitutional:       General: Not in acute distress.     Appearance: Healthy appearance. Well-developed and not in distress. Not diaphoretic.   Eyes:      Conjunctiva/sclera: Conjunctivae normal.      Pupils: Pupils are equal, round, and reactive to light.   HENT:      Head: Normocephalic and atraumatic.   Neck:      Vascular: No carotid bruit or JVD.   Pulmonary:      Effort: Pulmonary effort is normal. No respiratory distress.      Breath sounds: Normal breath sounds.   Cardiovascular:      Normal rate. Regular rhythm.   Edema:     Peripheral edema absent.   Skin:     General: Skin is cool.   Neurological:      Mental Status: Alert, oriented to person, place, and time and oriented to person, place and time.         No results found for: \"NA\", \"K\", \"CL\", \"CO2\", \"BUN\", \"CREATININE\", \"LABGLOM\", \"GLUCOSE\", \"CALCIUM\", \"AST\", \"ALT\", \"ALKPHOS\", \"LABIL2\"  No results found for: \"CKTOTAL\"  Lab Results   Component Value Date    WBC 8.80 08/08/2019    HGB 16.8 08/08/2019    HCT 52.6 (H) 08/08/2019     08/08/2019     No results found for: \"INR\"  No results found for: \"MG\"  Lab Results " "  Component Value Date    PSA 1.160 08/08/2019      No results found for: \"BNP\"    During this visit the following were done:  Labs Reviewed []    Labs Ordered []    Radiology Reports Reviewed []    Radiology Ordered []    PCP Records Reviewed []    Referring Provider Records Reviewed []    ER Records Reviewed []    Hospital Records Reviewed []    History Obtained From Family []    Radiology Images Reviewed []    Other Reviewed []    Records Requested []       Procedures    Assessment & Plan    Diagnosis Plan   1. Sleep apnea, unspecified type  Home Sleep Study      2. Dyspnea on exertion  Complete PFT - Pre & Post Bronchodilator               Recommendations:  Dyspnea on exertion  Given diastolic dysfunction I did recommend ruling out sleep apnea he was agreeable  Will also order PFT to rule out pulmonary etiology of dyspnea  If PFT is unremarkable will consider ischemic evaluation.  I requested labs from PCP  Essential hypertension  Currently well-controlled    No follow-ups on file.    As always, I appreciate very much the opportunity to participate in the cardiovascular care of your patients.      With Best Regards,    Jenaro Michele PA-C          "

## 2024-06-24 DIAGNOSIS — E78.5 DYSLIPIDEMIA: Primary | ICD-10-CM

## 2024-06-24 RX ORDER — ROSUVASTATIN CALCIUM 10 MG/1
10 TABLET, COATED ORAL DAILY
Qty: 30 TABLET | Refills: 11 | Status: SHIPPED | OUTPATIENT
Start: 2024-06-24

## 2024-07-24 ENCOUNTER — HOSPITAL ENCOUNTER (OUTPATIENT)
Dept: RESPIRATORY THERAPY | Facility: HOSPITAL | Age: 54
Discharge: HOME | End: 2024-07-24
Payer: MEDICARE

## 2024-07-24 DIAGNOSIS — R06.09 DYSPNEA ON EXERTION: ICD-10-CM

## 2024-07-24 PROCEDURE — 94640 AIRWAY INHALATION TREATMENT: CPT

## 2024-07-24 PROCEDURE — 94060 EVALUATION OF WHEEZING: CPT

## 2024-07-24 PROCEDURE — 94726 PLETHYSMOGRAPHY LUNG VOLUMES: CPT

## 2024-07-24 RX ORDER — ALBUTEROL SULFATE 2.5 MG/3ML
2.5 SOLUTION RESPIRATORY (INHALATION) ONCE
Status: COMPLETED | OUTPATIENT
Start: 2024-07-24 | End: 2024-07-24

## 2024-07-24 RX ADMIN — ALBUTEROL SULFATE 2.5 MG: 2.5 SOLUTION RESPIRATORY (INHALATION) at 13:09

## 2024-07-30 DIAGNOSIS — G47.30 SLEEP APNEA, UNSPECIFIED TYPE: ICD-10-CM

## 2024-08-05 DIAGNOSIS — J98.4 RESTRICTIVE LUNG DISEASE: Primary | ICD-10-CM

## 2024-08-06 ENCOUNTER — LAB (OUTPATIENT)
Dept: LAB | Facility: HOSPITAL | Age: 54
End: 2024-08-06
Payer: MEDICARE

## 2024-08-06 DIAGNOSIS — E78.5 DYSLIPIDEMIA: ICD-10-CM

## 2024-08-06 LAB
ALBUMIN SERPL-MCNC: 4.3 G/DL (ref 3.5–5.2)
ALBUMIN/GLOB SERPL: 1.5 G/DL
ALP SERPL-CCNC: 71 U/L (ref 39–117)
ALT SERPL W P-5'-P-CCNC: 30 U/L (ref 1–41)
ANION GAP SERPL CALCULATED.3IONS-SCNC: 13 MMOL/L (ref 5–15)
AST SERPL-CCNC: 26 U/L (ref 1–40)
BILIRUB SERPL-MCNC: 0.3 MG/DL (ref 0–1.2)
BUN SERPL-MCNC: 14 MG/DL (ref 6–20)
BUN/CREAT SERPL: 14.4 (ref 7–25)
CALCIUM SPEC-SCNC: 9.5 MG/DL (ref 8.6–10.5)
CHLORIDE SERPL-SCNC: 106 MMOL/L (ref 98–107)
CHOLEST SERPL-MCNC: 132 MG/DL (ref 0–200)
CO2 SERPL-SCNC: 19 MMOL/L (ref 22–29)
CREAT SERPL-MCNC: 0.97 MG/DL (ref 0.76–1.27)
EGFRCR SERPLBLD CKD-EPI 2021: 93.3 ML/MIN/1.73
GLOBULIN UR ELPH-MCNC: 2.9 GM/DL
GLUCOSE SERPL-MCNC: 89 MG/DL (ref 65–99)
HDLC SERPL-MCNC: 37 MG/DL (ref 40–60)
LDLC SERPL CALC-MCNC: 81 MG/DL (ref 0–100)
LDLC/HDLC SERPL: 2.19 {RATIO}
POTASSIUM SERPL-SCNC: 4.4 MMOL/L (ref 3.5–5.2)
PROT SERPL-MCNC: 7.2 G/DL (ref 6–8.5)
SODIUM SERPL-SCNC: 138 MMOL/L (ref 136–145)
TRIGL SERPL-MCNC: 70 MG/DL (ref 0–150)
VLDLC SERPL-MCNC: 14 MG/DL (ref 5–40)

## 2024-08-06 PROCEDURE — 80061 LIPID PANEL: CPT

## 2024-08-06 PROCEDURE — 80053 COMPREHEN METABOLIC PANEL: CPT

## 2024-08-06 PROCEDURE — 36415 COLL VENOUS BLD VENIPUNCTURE: CPT

## 2024-08-13 ENCOUNTER — OFFICE VISIT (OUTPATIENT)
Dept: CARDIOLOGY | Facility: CLINIC | Age: 54
End: 2024-08-13
Payer: MEDICARE

## 2024-08-13 VITALS
WEIGHT: 315 LBS | OXYGEN SATURATION: 94 % | BODY MASS INDEX: 42.66 KG/M2 | SYSTOLIC BLOOD PRESSURE: 140 MMHG | HEIGHT: 72 IN | RESPIRATION RATE: 18 BRPM | DIASTOLIC BLOOD PRESSURE: 85 MMHG | HEART RATE: 80 BPM

## 2024-08-13 DIAGNOSIS — I20.89 ANGINAL EQUIVALENT: Primary | ICD-10-CM

## 2024-08-13 DIAGNOSIS — J98.4 RESTRICTIVE LUNG DISEASE: ICD-10-CM

## 2024-08-13 DIAGNOSIS — G47.30 SLEEP APNEA, UNSPECIFIED TYPE: ICD-10-CM

## 2024-08-13 PROCEDURE — 99214 OFFICE O/P EST MOD 30 MIN: CPT | Performed by: PHYSICIAN ASSISTANT

## 2024-08-13 NOTE — PROGRESS NOTES
Debbie Leach PA  Daniele Phelps  1970 08/13/2024    Patient Active Problem List   Diagnosis    Low testosterone in male    Pseudogynecomastia    Hyperestrogenism    Positive colorectal cancer screening using Cologuard test    Gallstones       Dear Debbie Leach PA:    Subjective     History of Present Illness:    Chief Complaint   Patient presents with    Shortness of Breath       Daniele Phelps is a pleasant 54 y.o. male with a past medical history significant for    No Known Allergies:      Current Outpatient Medications:     citalopram (CeleXA) 20 MG tablet, Take 1 tablet by mouth Daily., Disp: , Rfl:     colchicine 0.6 MG tablet, Take 1 tablet by mouth Daily., Disp: , Rfl:     febuxostat (ULORIC) 40 MG tablet, Take 1 tablet by mouth Daily., Disp: , Rfl:     ibuprofen (ADVIL,MOTRIN) 800 MG tablet, Take 1 tablet by mouth Every 6 (Six) Hours As Needed for Mild Pain., Disp: , Rfl:     lisinopril (PRINIVIL,ZESTRIL) 40 MG tablet, Take 1 tablet by mouth Daily., Disp: , Rfl:     nabumetone (RELAFEN) 500 MG tablet, Take 1 tablet by mouth 2 (Two) Times a Day As Needed for Mild Pain., Disp: , Rfl:     rosuvastatin (CRESTOR) 10 MG tablet, Take 1 tablet by mouth Daily., Disp: 30 tablet, Rfl: 11    Semaglutide, 1 MG/DOSE, (Ozempic, 1 MG/DOSE,) 2 MG/1.5ML solution pen-injector, Inject  under the skin into the appropriate area as directed 1 (One) Time Per Week., Disp: , Rfl:     Testosterone Undecanoate 200 MG capsule, Take  by mouth., Disp: , Rfl:     The following portions of the patient's history were reviewed and updated as appropriate: allergies, current medications, past family history, past medical history, past social history, past surgical history and problem list.    Social History     Tobacco Use    Smoking status: Never    Smokeless tobacco: Never   Vaping Use    Vaping status: Never Used   Substance Use Topics    Alcohol use: Yes     Comment: Occasional    Drug use: No         Objective  "  Vitals:    08/13/24 1052   BP: 140/85   BP Location: Left arm   Patient Position: Sitting   Cuff Size: Large Adult   Pulse: 80   Resp: 18   SpO2: 94%   Weight: (!) 171 kg (378 lb)   Height: 182.9 cm (72.01\")     Body mass index is 51.25 kg/m².    ROS    Physical Exam    Lab Results   Component Value Date     08/06/2024    K 4.4 08/06/2024     08/06/2024    CO2 19.0 (L) 08/06/2024    BUN 14 08/06/2024    CREATININE 0.97 08/06/2024    GLUCOSE 89 08/06/2024    CALCIUM 9.5 08/06/2024    AST 26 08/06/2024    ALT 30 08/06/2024    ALKPHOS 71 08/06/2024     No results found for: \"CKTOTAL\"  Lab Results   Component Value Date    WBC 8.80 08/08/2019    HGB 16.8 08/08/2019    HCT 52.6 (H) 08/08/2019     08/08/2019     No results found for: \"INR\"  No results found for: \"MG\"  Lab Results   Component Value Date    PSA 1.160 08/08/2019    TRIG 70 08/06/2024    HDL 37 (L) 08/06/2024    LDL 81 08/06/2024      No results found for: \"BNP\"    During this visit the following were done:  Labs Reviewed []    Labs Ordered []    Radiology Reports Reviewed []    Radiology Ordered []    PCP Records Reviewed []    Referring Provider Records Reviewed []    ER Records Reviewed []    Hospital Records Reviewed []    History Obtained From Family []    Radiology Images Reviewed []    Other Reviewed []    Records Requested []       Procedures    Assessment & Plan   No diagnosis found.         Recommendations:      No follow-ups on file.    As always, I appreciate very much the opportunity to participate in the cardiovascular care of your patients.      With Best Regards,    Jenaro Michele PA-C          "

## 2024-08-13 NOTE — PROGRESS NOTES
Debbie Leach PA  Daniele Phelps  1970 08/13/2024    Patient Active Problem List   Diagnosis    Low testosterone in male    Pseudogynecomastia    Hyperestrogenism    Positive colorectal cancer screening using Cologuard test    Gallstones       Dear Debbie Leach PA:    Subjective     History of Present Illness:    Chief Complaint   Patient presents with    Shortness of Breath       Daniele Phelps is a pleasant 54 y.o. male with a past medical history significant for essential hypertension, diastolic dysfunction, diabetes mellitus. He also has family history of coronary artery disease. He comes in today for cardiology follow-up.     Fan did have sleep study performed and pulmonary function test.  Sleep study did reveal moderate sleep apnea he has already been fitted with a CPAP and has been trying to wear this consistently for 1 week now.  He does report he has been struggling with sleeping with it and is scheduled to see pulmonology.  PFT did revealed moderate restrictive lung disease.  He still reports fair amounts of shortness of breath on exertion although denies any overt chest pains.  He reports he has been trying improved lifestyle and is exercising regularly.  Denies any syncope or near syncope, orthopnea, or PND.    No Known Allergies:      Current Outpatient Medications:     citalopram (CeleXA) 20 MG tablet, Take 1 tablet by mouth Daily., Disp: , Rfl:     colchicine 0.6 MG tablet, Take 1 tablet by mouth Daily., Disp: , Rfl:     febuxostat (ULORIC) 40 MG tablet, Take 1 tablet by mouth Daily., Disp: , Rfl:     ibuprofen (ADVIL,MOTRIN) 800 MG tablet, Take 1 tablet by mouth Every 6 (Six) Hours As Needed for Mild Pain., Disp: , Rfl:     lisinopril (PRINIVIL,ZESTRIL) 40 MG tablet, Take 1 tablet by mouth Daily., Disp: , Rfl:     nabumetone (RELAFEN) 500 MG tablet, Take 1 tablet by mouth 2 (Two) Times a Day As Needed for Mild Pain., Disp: , Rfl:     rosuvastatin (CRESTOR) 10 MG tablet,  "Take 1 tablet by mouth Daily., Disp: 30 tablet, Rfl: 11    Semaglutide, 1 MG/DOSE, (Ozempic, 1 MG/DOSE,) 2 MG/1.5ML solution pen-injector, Inject  under the skin into the appropriate area as directed 1 (One) Time Per Week., Disp: , Rfl:     Testosterone Undecanoate 200 MG capsule, Take  by mouth., Disp: , Rfl:     The following portions of the patient's history were reviewed and updated as appropriate: allergies, current medications, past family history, past medical history, past social history, past surgical history and problem list.    Social History     Tobacco Use    Smoking status: Never    Smokeless tobacco: Never   Vaping Use    Vaping status: Never Used   Substance Use Topics    Alcohol use: Yes     Comment: Occasional    Drug use: No         Objective   Vitals:    08/13/24 1052   BP: 140/85   BP Location: Left arm   Patient Position: Sitting   Cuff Size: Large Adult   Pulse: 80   Resp: 18   SpO2: 94%   Weight: (!) 171 kg (378 lb)   Height: 182.9 cm (72.01\")     Body mass index is 51.25 kg/m².    ROS    Constitutional:       General: Not in acute distress.     Appearance: Healthy appearance. Well-developed and not in distress. Not diaphoretic.   Eyes:      Conjunctiva/sclera: Conjunctivae normal.      Pupils: Pupils are equal, round, and reactive to light.   HENT:      Head: Normocephalic and atraumatic.   Neck:      Vascular: No carotid bruit or JVD.   Pulmonary:      Effort: Pulmonary effort is normal. No respiratory distress.      Breath sounds: Normal breath sounds.   Cardiovascular:      Normal rate. Regular rhythm.   Edema:     Peripheral edema absent.   Skin:     General: Skin is cool.   Neurological:      Mental Status: Alert, oriented to person, place, and time and oriented to person, place and time.         Lab Results   Component Value Date     08/06/2024    K 4.4 08/06/2024     08/06/2024    CO2 19.0 (L) 08/06/2024    BUN 14 08/06/2024    CREATININE 0.97 08/06/2024    GLUCOSE 89 " "08/06/2024    CALCIUM 9.5 08/06/2024    AST 26 08/06/2024    ALT 30 08/06/2024    ALKPHOS 71 08/06/2024     No results found for: \"CKTOTAL\"  Lab Results   Component Value Date    WBC 8.80 08/08/2019    HGB 16.8 08/08/2019    HCT 52.6 (H) 08/08/2019     08/08/2019     No results found for: \"INR\"  No results found for: \"MG\"  Lab Results   Component Value Date    PSA 1.160 08/08/2019    TRIG 70 08/06/2024    HDL 37 (L) 08/06/2024    LDL 81 08/06/2024      No results found for: \"BNP\"    During this visit the following were done:  Labs Reviewed []    Labs Ordered []    Radiology Reports Reviewed []    Radiology Ordered []    PCP Records Reviewed []    Referring Provider Records Reviewed []    ER Records Reviewed []    Hospital Records Reviewed []    History Obtained From Family []    Radiology Images Reviewed []    Other Reviewed []    Records Requested []       Procedures    Assessment & Plan    Diagnosis Plan   1. Anginal equivalent  Stress Test With Myocardial Perfusion One Day      2. Restrictive lung disease        3. Sleep apnea, unspecified type                 Recommendations:  Dyspnea on exertion  May be anginal equivalent as he is diabetic.  Will order stress test.  Restrictive lung disease/sleep apnea  Scheduled to see pulmonology he is wearing CPAP nightly.  Discussed with him that weight loss may improve sleep apnea he is on Ozempic and making lifestyle changes.  Essential hypertension  Currently well-controlled    Return in about 2 months (around 10/13/2024).    As always, I appreciate very much the opportunity to participate in the cardiovascular care of your patients.      With Best Regards,    Jenaro Michele PA-C          "

## 2024-08-22 ENCOUNTER — HOSPITAL ENCOUNTER (OUTPATIENT)
Dept: NUCLEAR MEDICINE | Facility: HOSPITAL | Age: 54
Discharge: HOME | End: 2024-08-22
Payer: MEDICARE

## 2024-08-22 ENCOUNTER — HOSPITAL ENCOUNTER (OUTPATIENT)
Dept: CARDIOLOGY | Facility: HOSPITAL | Age: 54
Discharge: HOME | End: 2024-08-22
Payer: MEDICARE

## 2024-08-22 DIAGNOSIS — I20.89 ANGINAL EQUIVALENT: ICD-10-CM

## 2024-08-22 LAB
BH CV NUCLEAR PRIOR STUDY: 3
BH CV REST NUCLEAR ISOTOPE DOSE: 9.9 MCI
BH CV STRESS BP STAGE 1: NORMAL
BH CV STRESS BP STAGE 2: NORMAL
BH CV STRESS DURATION MIN STAGE 1: 3
BH CV STRESS DURATION MIN STAGE 2: 3
BH CV STRESS DURATION SEC STAGE 1: 0
BH CV STRESS DURATION SEC STAGE 2: 0
BH CV STRESS GRADE STAGE 1: 10
BH CV STRESS GRADE STAGE 2: 12
BH CV STRESS HR STAGE 1: 117
BH CV STRESS HR STAGE 2: 144
BH CV STRESS METS STAGE 1: 5
BH CV STRESS METS STAGE 2: 7.5
BH CV STRESS NUCLEAR ISOTOPE DOSE: 30 MCI
BH CV STRESS PROTOCOL 1: NORMAL
BH CV STRESS RECOVERY BP: NORMAL MMHG
BH CV STRESS RECOVERY HR: 99 BPM
BH CV STRESS SPEED STAGE 1: 1.7
BH CV STRESS SPEED STAGE 2: 2.5
BH CV STRESS STAGE 1: 1
BH CV STRESS STAGE 2: 2
LV EF NUC BP: 53 %
MAXIMAL PREDICTED HEART RATE: 167 BPM
PERCENT MAX PREDICTED HR: 86.23 %
STRESS BASELINE BP: NORMAL MMHG
STRESS BASELINE HR: 96 BPM
STRESS PERCENT HR: 101 %
STRESS POST ESTIMATED WORKLOAD: 7 METS
STRESS POST EXERCISE DUR MIN: 6 MIN
STRESS POST PEAK BP: NORMAL MMHG
STRESS POST PEAK HR: 144 BPM
STRESS TARGET HR: 142 BPM

## 2024-08-22 PROCEDURE — 0 TECHNETIUM SESTAMIBI: Performed by: PHYSICIAN ASSISTANT

## 2024-08-22 PROCEDURE — A9500 TC99M SESTAMIBI: HCPCS | Performed by: PHYSICIAN ASSISTANT

## 2024-08-22 PROCEDURE — 78452 HT MUSCLE IMAGE SPECT MULT: CPT

## 2024-08-22 PROCEDURE — 93017 CV STRESS TEST TRACING ONLY: CPT

## 2024-08-22 RX ADMIN — TECHNETIUM TC 99M SESTAMIBI 1 DOSE: 1 INJECTION INTRAVENOUS at 11:50

## 2024-08-22 RX ADMIN — TECHNETIUM TC 99M SESTAMIBI 1 DOSE: 1 INJECTION INTRAVENOUS at 10:48

## 2024-09-03 ENCOUNTER — OFFICE VISIT (OUTPATIENT)
Dept: PULMONOLOGY | Facility: CLINIC | Age: 54
End: 2024-09-03
Payer: MEDICARE

## 2024-09-03 VITALS
WEIGHT: 315 LBS | BODY MASS INDEX: 42.66 KG/M2 | HEIGHT: 72 IN | TEMPERATURE: 97.4 F | DIASTOLIC BLOOD PRESSURE: 80 MMHG | HEART RATE: 98 BPM | OXYGEN SATURATION: 96 % | SYSTOLIC BLOOD PRESSURE: 118 MMHG

## 2024-09-03 DIAGNOSIS — J98.4 RESTRICTIVE LUNG DISEASE: ICD-10-CM

## 2024-09-03 DIAGNOSIS — G47.33 OBSTRUCTIVE SLEEP APNEA: Primary | ICD-10-CM

## 2024-09-03 DIAGNOSIS — R06.02 SHORTNESS OF BREATH: ICD-10-CM

## 2024-09-03 PROCEDURE — 1160F RVW MEDS BY RX/DR IN RCRD: CPT | Performed by: INTERNAL MEDICINE

## 2024-09-03 PROCEDURE — 1159F MED LIST DOCD IN RCRD: CPT | Performed by: INTERNAL MEDICINE

## 2024-09-03 PROCEDURE — 99203 OFFICE O/P NEW LOW 30 MIN: CPT | Performed by: INTERNAL MEDICINE

## 2024-09-03 RX ORDER — TESTOSTERONE CYPIONATE 200 MG/ML
INJECTION, SOLUTION INTRAMUSCULAR
COMMUNITY
Start: 2024-07-12

## 2024-09-03 RX ORDER — SEMAGLUTIDE 2.68 MG/ML
INJECTION, SOLUTION SUBCUTANEOUS
COMMUNITY
Start: 2024-04-19

## 2024-09-03 NOTE — PROGRESS NOTES
Chief Complaint  RESTRICTIVE LUNG DISEASE  and Sleep Apnea (On pap therapy )    Daniele Phelps is a 54 y.o. male who presents today to [facility]MON[facility]ICINE for RESTRICTIVE LUNG DISEASE  and Sleep Apnea (On pap therapy ).    HPI:   Patient is a 54-year-old morbidly obese male who is referred to me for further evaluation of sleep disordered breathing as well as abnormal PFTs which showed restrictive lung physiology.    Patient worked as a  and has been exposed to glue and other chemicals.    He reported shortness of breath on walking short distances.  He reported occasional wheezing.  He denies joint stiffness, photosensitivity and rash.    He has history of severe gout.  His activity is limited due to leg pain as well as shortness of breath.    He had a sleep study done recently which showed AHI of 23.  He was started on AutoPap.  I reviewed the data from his joseluis which showed that he is compliant with it and AHI is down to only 1.    However significant air leak noted.  Patient reported difficulty using AutoPap.    He denies claustrophobia, nasal congestion, but reported oral and nasal dryness.    He reported that his quality of sleep has improved since he has started using AutoPap.  He feels more vigilant during the daytime.    He denies chest pain, orthopnea, PND, leg edema, nausea, vomiting, diarrhea, hemoptysis, hematemesis.    He was recently started on Ozempic and claims to have lost 40 pounds.        Previous History:   Past Medical History:   Diagnosis Date    Arthritis     Elevated cholesterol     Hyperestrogenism 6/14/2019    Hypertension       Past Surgical History:   Procedure Laterality Date    COLONOSCOPY N/A 2/17/2022    Procedure: COLONOSCOPY;  Surgeon: Oswald Chan MD;  Location: [facility];  Service: Gastroenterology;  Laterality: N/A;    EXTRACORPOREAL SHOCK WAVE LITHOTRIPSY (ESWL)  2002      Social History     Socioeconomic History    Marital  "status:    Tobacco Use    Smoking status: Never     Passive exposure: Never    Smokeless tobacco: Never   Vaping Use    Vaping status: Never Used   Substance and Sexual Activity    Alcohol use: Yes     Comment: Occasional    Drug use: No    Sexual activity: Yes     Partners: Female     Birth control/protection: None        Current Medications:  Current Outpatient Medications   Medication Sig Dispense Refill    citalopram (CeleXA) 20 MG tablet Take 1 tablet by mouth Daily.      colchicine 0.6 MG tablet Take 1 tablet by mouth Daily.      febuxostat (ULORIC) 40 MG tablet Take 1 tablet by mouth Daily.      ibuprofen (ADVIL,MOTRIN) 800 MG tablet Take 1 tablet by mouth Every 6 (Six) Hours As Needed for Mild Pain.      lisinopril (PRINIVIL,ZESTRIL) 40 MG tablet Take 1 tablet by mouth Daily.      nabumetone (RELAFEN) 500 MG tablet Take 1 tablet by mouth 2 (Two) Times a Day As Needed for Mild Pain.      Ozempic, 2 MG/DOSE, 8 MG/3ML solution pen-injector INJECT 2 MG SUBCUTANEOUSLY ONCE A WEEK      rosuvastatin (CRESTOR) 10 MG tablet Take 1 tablet by mouth Daily. 30 tablet 11    Testosterone Cypionate (DEPOTESTOTERONE CYPIONATE) 200 MG/ML injection INJECT 1/2 (ONE-HALF) ML INTRAMUSCULARLY EVERY TWO WEEKS       No current facility-administered medications for this visit.       Allergies:   No Known Allergies    Vitals:   /80   Pulse 98   Temp 97.4 °F (36.3 °C)   Ht 182.9 cm (72\")   Wt (!) 170 kg (375 lb)   SpO2 96%   BMI 50.86 kg/m²     Estimated body mass index is 50.86 kg/m² as calculated from the following:    Height as of this encounter: 182.9 cm (72\").    Weight as of this encounter: 170 kg (375 lb).    Daniele Phelps  reports that he has never smoked. He has never been exposed to tobacco smoke. He has never used smokeless tobacco.         Physical Exam:   Physical Exam  Vitals reviewed.   Constitutional:       Appearance: Normal appearance. He is obese.      Interventions: He is not intubated.  HENT: " "     Head: Normocephalic.      Nose: Nose normal.      Mouth/Throat:      Mouth: Mucous membranes are moist.      Comments: Severe oropharyngeal crowding  Eyes:      Extraocular Movements: Extraocular movements intact.      Conjunctiva/sclera: Conjunctivae normal.      Pupils: Pupils are equal, round, and reactive to light.   Cardiovascular:      Rate and Rhythm: Normal rate.      Heart sounds: No murmur heard.     No friction rub. No gallop.   Pulmonary:      Effort: Pulmonary effort is normal. No tachypnea, bradypnea, accessory muscle usage, prolonged expiration, respiratory distress or retractions. He is not intubated.      Breath sounds: Normal breath sounds. No stridor, decreased air movement or transmitted upper airway sounds.   Abdominal:      General: There is no distension.      Palpations: Abdomen is soft. There is no mass.      Tenderness: There is no abdominal tenderness. There is no right CVA tenderness, left CVA tenderness, guarding or rebound.      Hernia: No hernia is present.   Skin:     Coloration: Skin is not jaundiced.      Findings: No erythema.   Neurological:      General: No focal deficit present.      Mental Status: He is alert and oriented to person, place, and time.   Psychiatric:         Mood and Affect: Mood normal.          Procedures     STOP-Bang Score: STOP-Bang Score  Have you been diagnosed with Sleep Apnea?: yes  Snoring?: yes  Tired?: yes  Observed?: yes  Pressure?: yes  Stop Score: 4  Body Mass Index more than 35 kg/m2?: yes  Age older than 50 year old?: yes  Neck large? \">17\"/43cm-M, >16\"/41cm-F: yes  Gender=Male?: no  Total Stop-Bang Score: 7   [place] Sleepiness Scale: [place] Sleepiness Scale  Sitting and reading: Slight chance of dozing  Watching TV: Moderate chance of dozing  Sitting, inactive in a public place (e.g. a theatre or a meeting): Would never doze  As a passenger in a car for an hour without a break: Would never doze  Lying down to rest in the afternoon when " circumstances permit: High chance of dozing  Sitting and talking to someone: Would never doze  Sitting quietly after a lunch without alcohol: Would never doze  In a car, while stopped for a few minutes in traffic: Would never doze  Total score: 6     Lab Results:   Hospital Outpatient Visit on 08/22/2024   Component Date Value Ref Range Status    Nuclear Prior Study 08/22/2024 3.0   Final    BH CV REST NUCLEAR ISOTOPE DOSE 08/22/2024 9.9  mCi Final    BH CV STRESS NUCLEAR ISOTOPE DOSE 08/22/2024 30.0  mCi Final    Exercise duration (min) 08/22/2024 6  min Final    Estimated workload 08/22/2024 7.0  METS Final    BH CV STRESS PROTOCOL 1 08/22/2024 Jem   Final    Stage 1 08/22/2024 1.0   Final    HR Stage 1 08/22/2024 117   Final    BP Stage 1 08/22/2024 159/81   Final    Duration Min Stage 1 08/22/2024 3   Final    Duration Sec Stage 1 08/22/2024 0   Final    Grade Stage 1 08/22/2024 10   Final    Speed Stage 1 08/22/2024 1.7   Final    BH CV STRESS METS STAGE 1 08/22/2024 5.0   Final    Stage 2 08/22/2024 2.0   Final    HR Stage 2 08/22/2024 144   Final    BP Stage 2 08/22/2024 204/78   Final    Duration Min Stage 2 08/22/2024 3   Final    Duration Sec Stage 2 08/22/2024 0   Final    Grade Stage 2 08/22/2024 12   Final    Speed Stage 2 08/22/2024 2.5   Final    BH CV STRESS METS STAGE 2 08/22/2024 7.5   Final    Baseline HR 08/22/2024 96  bpm Final    Baseline BP 08/22/2024 129/94  mmHg Final    Peak HR 08/22/2024 144  bpm Final    Peak BP 08/22/2024 204/78  mmHg Final    Recovery HR 08/22/2024 99  bpm Final    Target HR (85%) 08/22/2024 142  bpm Final    Max. Pred. HR (100%) 08/22/2024 167  bpm Final    Percent Max Pred HR 08/22/2024 86.23  % Final    Percent Target HR 08/22/2024 101  % Final    Recovery BP 08/22/2024 145/92  mmHg Final    Nuc Stress EF 08/22/2024 53  % Final   Lab on 08/06/2024   Component Date Value Ref Range Status    Glucose 08/06/2024 89  65 - 99 mg/dL Final    BUN 08/06/2024 14  6 - 20  mg/dL Final    Creatinine 08/06/2024 0.97  0.76 - 1.27 mg/dL Final    Sodium 08/06/2024 138  136 - 145 mmol/L Final    Potassium 08/06/2024 4.4  3.5 - 5.2 mmol/L Final    Slight hemolysis detected by analyzer. Result may be falsely elevated.    Chloride 08/06/2024 106  98 - 107 mmol/L Final    CO2 08/06/2024 19.0 (L)  22.0 - 29.0 mmol/L Final    Calcium 08/06/2024 9.5  8.6 - 10.5 mg/dL Final    Total Protein 08/06/2024 7.2  6.0 - 8.5 g/dL Final    Albumin 08/06/2024 4.3  3.5 - 5.2 g/dL Final    ALT (SGPT) 08/06/2024 30  1 - 41 U/L Final    AST (SGOT) 08/06/2024 26  1 - 40 U/L Final    Slight hemolysis detected by analyzer. Result may be falsely elevated.    Alkaline Phosphatase 08/06/2024 71  39 - 117 U/L Final    Total Bilirubin 08/06/2024 0.3  0.0 - 1.2 mg/dL Final    Globulin 08/06/2024 2.9  gm/dL Final    A/G Ratio 08/06/2024 1.5  g/dL Final    BUN/Creatinine Ratio 08/06/2024 14.4  7.0 - 25.0 Final    Anion Gap 08/06/2024 13.0  5.0 - 15.0 mmol/L Final    eGFR 08/06/2024 93.3  >60.0 mL/min/1.73 Final    Total Cholesterol 08/06/2024 132  0 - 200 mg/dL Final    Triglycerides 08/06/2024 70  0 - 150 mg/dL Final    HDL Cholesterol 08/06/2024 37 (L)  40 - 60 mg/dL Final    LDL Cholesterol  08/06/2024 81  0 - 100 mg/dL Final    VLDL Cholesterol 08/06/2024 14  5 - 40 mg/dL Final    LDL/HDL Ratio 08/06/2024 2.19   Final   Hospital Outpatient Visit on 06/04/2024   Component Date Value Ref Range Status    LVIDd 06/04/2024 3.4  cm Final    LVIDs 06/04/2024 3.0  cm Final    IVSd 06/04/2024 1.65  cm Final    LVPWd 06/04/2024 1.40  cm Final    FS 06/04/2024 10.1  % Final    IVS/LVPW 06/04/2024 1.18  cm Final    ESV(cubed) 06/04/2024 27.5  ml Final    EDV(cubed) 06/04/2024 37.9  ml Final    LV mass(C)d 06/04/2024 188.2  grams Final    LVOT area 06/04/2024 4.9  cm2 Final    LVOT diam 06/04/2024 2.5  cm Final    MV E max sorin 06/04/2024 64.0  cm/sec Final    MV A max sorin 06/04/2024 72.8  cm/sec Final    MV E/A 06/04/2024 0.88    "Final    Med Peak E' Philip 06/04/2024 6.4  cm/sec Final    Lat Peak E' Philip 06/04/2024 10.0  cm/sec Final    Avg E/e' ratio 06/04/2024 7.80   Final    LA dimension (2D)  06/04/2024 3.0  cm Final    Ao pk philip 06/04/2024 100.0  cm/sec Final    Ao max PG 06/04/2024 4.0  mmHg Final    Ao mean PG 06/04/2024 3.0  mmHg Final    Ao V2 VTI 06/04/2024 20.2  cm Final    PA acc time 06/04/2024 0.06  sec Final    Ao root diam 06/04/2024 3.5  cm Final       Lab Results (last 72 hours)       ** No results found for the last 72 hours. **          WBC No results found for: \"WBC\"   HGB No results found for: \"HGB\"   HCT No results found for: \"HCT\"   Platlets No results found for: \"LABPLAT\"     PT/INR:  No results found for: \"PROTIME\"/No results found for: \"INR\"    Sodium No results found for: \"NA\"   Potassium No results found for: \"K\"   Chloride No results found for: \"CL\"   Bicarbonate No results found for: \"PLASMABICARB\"   BUN No results found for: \"BUN\"   Creatinine No results found for: \"CREATININE\"   Calcium No results found for: \"CALCIUM\"   Magnesium No results found for: \"MG\"     pH No results found for: \"PHART\"   pO2 No results found for: \"PO2ART\"   pCO2 No results found for: \"XGZ0AHT\"   HCO3 No results found for: \"WNI4VET\"           Radiology Review:   No results found for this or any previous visit.     No results found for this or any previous visit.    No results found for this or any previous visit.    No results found for this or any previous visit.    No results found for this or any previous visit.    No results found for this or any previous visit.     No results found for this or any previous visit.    No results found for this or any previous visit.    No results found for this or any previous visit.                  Results Review: I reviewed the patient's new clinical results.    Assessment and Plan    Visit Diagnosis:     ICD-10-CM ICD-9-CM   1. Obstructive sleep apnea  G47.33 327.23   2. Restrictive lung disease  " J98.4 518.89   3. Shortness of breath  R06.02 786.05   Moderate obstructive sleep apnea.  Consequences of untreated sleep apnea including hypertension, increased risk of coronary artery disease, congestive heart failure, arrhythmia, depression, anxiety, dementia, stroke, erectile dysfunction and decrease in life expectancy  discussed with the patient.    Patient is willing to use the AutoPap.    PFT showed restrictive lung physiology.  Unfortunately diffusion capacity was not done.  Patient has been exposed to glue and different chemicals.  Will go ahead and get a high-resolution CAT scan.    Return to clinic in 2 months, early as needed    New Medications:   No orders of the defined types were placed in this encounter.      Discontinued Medications:   Medications Discontinued During This Encounter   Medication Reason    Testosterone Undecanoate 200 MG capsule Duplicate order    Semaglutide, 1 MG/DOSE, (Ozempic, 1 MG/DOSE,) 2 MG/1.5ML solution pen-injector Duplicate order            Follow Up:       Patient was given instructions and counseling regarding his condition. Please see specific information pulled into the AVS if appropriate.       This document has been electronically signed by Titi Metcalf MD   September 3, 2024 15:48 EDT    Dictated Utilizing Dragon Dictation: Part of this note may be an electronic transcription/translation of spoken language to printed text using the Dragon Dictation System.

## 2024-09-06 ENCOUNTER — PATIENT ROUNDING (BHMG ONLY) (OUTPATIENT)
Dept: PULMONOLOGY | Facility: CLINIC | Age: 54
End: 2024-09-06
Payer: MEDICARE

## 2024-09-06 NOTE — PROGRESS NOTES
September 6, 2024    Hello, may I speak with Daniele Phelps?    My name is Kaelyn      I am  with [facility]  [address]  [place] [address] [phone].    Before we get started may I verify your date of birth? 1970    I am calling to officially welcome you to our practice and ask about your recent visit. Is this a good time to talk? yes    Tell me about your visit with us. What things went well?  It went good.        We're always looking for ways to make our patients' experiences even better. Do you have recommendations on ways we may improve?  no    Overall were you satisfied with your first visit to our practice? yes       I appreciate you taking the time to speak with me today. Is there anything else I can do for you? no      Thank you, and have a great day.

## 2024-09-25 ENCOUNTER — HOSPITAL ENCOUNTER (OUTPATIENT)
Dept: CT IMAGING | Facility: HOSPITAL | Age: 54
Discharge: HOME | End: 2024-09-25
Payer: MEDICARE

## 2024-09-25 DIAGNOSIS — J98.4 RESTRICTIVE LUNG DISEASE: ICD-10-CM

## 2024-09-25 DIAGNOSIS — R06.02 SHORTNESS OF BREATH: ICD-10-CM

## 2024-09-25 PROCEDURE — 71250 CT THORAX DX C-: CPT

## 2024-10-29 ENCOUNTER — TELEPHONE (OUTPATIENT)
Dept: PULMONOLOGY | Facility: CLINIC | Age: 54
End: 2024-10-29

## 2024-10-29 ENCOUNTER — OFFICE VISIT (OUTPATIENT)
Dept: UROLOGY | Facility: CLINIC | Age: 54
End: 2024-10-29
Payer: MEDICARE

## 2024-10-29 VITALS
HEART RATE: 80 BPM | WEIGHT: 315 LBS | SYSTOLIC BLOOD PRESSURE: 140 MMHG | HEIGHT: 72 IN | DIASTOLIC BLOOD PRESSURE: 98 MMHG | BODY MASS INDEX: 42.66 KG/M2

## 2024-10-29 DIAGNOSIS — R79.89 LOW TESTOSTERONE IN MALE: ICD-10-CM

## 2024-10-29 DIAGNOSIS — R39.12 BENIGN PROSTATIC HYPERPLASIA WITH WEAK URINARY STREAM: Primary | ICD-10-CM

## 2024-10-29 DIAGNOSIS — N40.1 BENIGN PROSTATIC HYPERPLASIA WITH WEAK URINARY STREAM: Primary | ICD-10-CM

## 2024-10-29 DIAGNOSIS — E34.8 HYPERESTROGENISM IN MALE: ICD-10-CM

## 2024-10-29 DIAGNOSIS — N52.01 ERECTILE DYSFUNCTION DUE TO ARTERIAL INSUFFICIENCY: ICD-10-CM

## 2024-10-29 PROCEDURE — 82670 ASSAY OF TOTAL ESTRADIOL: CPT

## 2024-10-29 PROCEDURE — 85027 COMPLETE CBC AUTOMATED: CPT

## 2024-10-29 PROCEDURE — 84153 ASSAY OF PSA TOTAL: CPT

## 2024-10-29 PROCEDURE — 84403 ASSAY OF TOTAL TESTOSTERONE: CPT

## 2024-10-29 RX ORDER — TADALAFIL 5 MG/1
5 TABLET ORAL DAILY
Qty: 30 TABLET | Refills: 2 | Status: SHIPPED | OUTPATIENT
Start: 2024-10-29

## 2024-10-29 NOTE — PROGRESS NOTES
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      ROS:   Review of Systems   Constitutional:  Negative for chills, fatigue and fever.   HENT: Negative.     Eyes: Negative.    Respiratory:  Negative for cough, shortness of breath and wheezing.    Cardiovascular:  Negative for leg swelling.   Gastrointestinal:  Negative for abdominal pain, nausea and vomiting.   Endocrine: Negative.    Genitourinary:  Positive for erectile dysfunction. Negative for difficulty urinating, dysuria, frequency, penile swelling, scrotal swelling, testicular pain and urgency.   Musculoskeletal:  Negative for back pain and joint swelling.   Skin: Negative.    Allergic/Immunologic: Negative.    Neurological:  Negative for dizziness, headache and confusion.   Hematological: Negative.    Psychiatric/Behavioral: Negative.          Physical Exam   Physical Exam    Result Review :{ Labs  Result Review  Imaging  Med Tab  Media :23}   {The following data was reviewed by (Optional):07631}  {Ambulatory Labs (Optional):92008}  {Data reviewed (Optional):61714:::1}                Assessment and Plan {CC Problem List  Visit Diagnosis  ROS  Review ([facility])  [facility]  Maintenance  Quality  BestPractice  Medications  SmartSets  SnapShot Encounters  Media :23}   Problem List Items Addressed This Visit    None      Assessment & Plan    Patient reports that he is not currently experiencing any symptoms of urinary incontinence.             RADIOLOGY (CT AND/OR KUB):    CT Abdomen and Pelvis: No results found for this or any previous visit.     CT Stone Protocol: No results found for this or any previous visit.     KUB: No results found for this or any previous visit.       [unfilled]  LABS (3 MONTHS):    Hospital Outpatient Visit on 08/22/2024   Component Date Value Ref Range Status    Nuclear Prior Study 08/22/2024 3.0   Final    BH CV REST NUCLEAR ISOTOPE DOSE 08/22/2024 9.9  mCi Final    BH CV STRESS NUCLEAR ISOTOPE DOSE 08/22/2024 30.0  mCi Final    Exercise duration (min) 08/22/2024 6  min Final    Estimated workload 08/22/2024 7.0  METS Final    BH CV STRESS PROTOCOL 1 08/22/2024 Jem   Final    Stage 1 08/22/2024 1.0   Final    HR Stage 1 08/22/2024 117   Final    BP Stage 1 08/22/2024 159/81   Final    Duration Min Stage 1 08/22/2024 3   Final    Duration Sec Stage 1 08/22/2024 0   Final    Grade Stage 1 08/22/2024 10   Final    Speed Stage 1 08/22/2024 1.7   Final    BH CV STRESS METS STAGE 1 08/22/2024 5.0   Final    Stage 2 08/22/2024 2.0   Final    HR Stage 2 08/22/2024 144   Final    BP Stage 2 08/22/2024 204/78   Final    Duration Min Stage 2 08/22/2024 3   Final    Duration Sec Stage 2 08/22/2024 0   Final    Grade Stage 2 08/22/2024 12   Final    Speed Stage 2 08/22/2024 2.5   Final    BH CV STRESS METS STAGE 2 08/22/2024 7.5   Final    Baseline HR 08/22/2024 96  bpm Final    Baseline BP 08/22/2024 129/94  mmHg Final    Peak HR 08/22/2024 144  bpm Final    Peak BP 08/22/2024 204/78  mmHg Final    Recovery HR 08/22/2024 99  bpm Final    Target HR (85%) 08/22/2024 142  bpm Final    Max. Pred. HR (100%) 08/22/2024 167  bpm Final    Percent Max Pred HR 08/22/2024  86.23  % Final    Percent Target HR 08/22/2024 101  % Final    Recovery BP 08/22/2024 145/92  mmHg Final    Nuc Stress EF 08/22/2024 53  % Final   Lab on 08/06/2024   Component Date Value Ref Range Status    Glucose 08/06/2024 89  65 - 99 mg/dL Final    BUN 08/06/2024 14  6 - 20 mg/dL Final    Creatinine 08/06/2024 0.97  0.76 - 1.27 mg/dL Final    Sodium 08/06/2024 138  136 - 145 mmol/L Final    Potassium 08/06/2024 4.4  3.5 - 5.2 mmol/L Final    Slight hemolysis detected by analyzer. Result may be falsely elevated.    Chloride 08/06/2024 106  98 - 107 mmol/L Final    CO2 08/06/2024 19.0 (L)  22.0 - 29.0 mmol/L Final    Calcium 08/06/2024 9.5  8.6 - 10.5 mg/dL Final    Total Protein 08/06/2024 7.2  6.0 - 8.5 g/dL Final    Albumin 08/06/2024 4.3  3.5 - 5.2 g/dL Final    ALT (SGPT) 08/06/2024 30  1 - 41 U/L Final    AST (SGOT) 08/06/2024 26  1 - 40 U/L Final    Slight hemolysis detected by analyzer. Result may be falsely elevated.    Alkaline Phosphatase 08/06/2024 71  39 - 117 U/L Final    Total Bilirubin 08/06/2024 0.3  0.0 - 1.2 mg/dL Final    Globulin 08/06/2024 2.9  gm/dL Final    A/G Ratio 08/06/2024 1.5  g/dL Final    BUN/Creatinine Ratio 08/06/2024 14.4  7.0 - 25.0 Final    Anion Gap 08/06/2024 13.0  5.0 - 15.0 mmol/L Final    eGFR 08/06/2024 93.3  >60.0 mL/min/1.73 Final    Total Cholesterol 08/06/2024 132  0 - 200 mg/dL Final    Triglycerides 08/06/2024 70  0 - 150 mg/dL Final    HDL Cholesterol 08/06/2024 37 (L)  40 - 60 mg/dL Final    LDL Cholesterol  08/06/2024 81  0 - 100 mg/dL Final    VLDL Cholesterol 08/06/2024 14  5 - 40 mg/dL Final    LDL/HDL Ratio 08/06/2024 2.19   Final          During this visit, I spent *** minutes counseling Daniele regarding tobacco cessation.    {Time Spent (Optional):00673}    Follow Up {Instructions Charge Capture  Follow-up Communications :23}  No follow-ups on file.    Patient was given instructions and counseling regarding his condition or for health maintenance  advice. Please see specific information pulled into the AVS if appropriate.          This document has been electronically signed by Farida Mckeon MA   October 29, 2024 10:15 EDT      Dictated Utilizing Dragon Dictation: Part of this note may be an electronic transcription/translation of spoken language to printed text using the Dragon Dictation System.      {LEONARD [employer] Provider Statement:[id number]}

## 2024-10-29 NOTE — TELEPHONE ENCOUNTER
Caller: katlin rubio    Relationship: Emergency Contact    Best call back number:   [phone]  (PT)   [phone] (CALLER)   Caller requesting test results: SPOUSE     What test was performed: CT     When was the test performed: A FEW WEEKS AGO     Where was the test performed:  HOSP OR  CENTER, UNSURE WHICH     Additional notes: PT IS REQ A VERBAL RESULT FROM CT PERFORMED A FEW WEEKS AGO AT , IS OKAY W A TEXT AS WELL. PT DOES NOT HAVE MYCHART

## 2024-10-29 NOTE — PROGRESS NOTES
"Chief Complaint:    Chief Complaint   Patient presents with    Erectile Dysfunction       Vital Signs:   /98 (BP Location: Left arm, Patient Position: Sitting)   Pulse 80   Ht 182.9 cm (72.01\")   Wt (!) 170 kg (374 lb)   BMI 50.71 kg/m²   Body mass index is 50.71 kg/m².      HPI:  Daniele Phelps is a 54 y.o. male who presents today for initial evaluation     History of Present Illness  Mr. Phelps presents to the clinic today to reestablish care.  He has a past medical history sniffer hyperestrogenism, hypertension, hyperlipidemia, and low testosterone.  He was initially evaluated by Dr. Esquivel in 2019 and is undergoing testosterone replacement therapy however due to COVID was lost to several follow-ups.  He has not been seen since.  States he has had an increase in worsening erections over the past few months.  He endorses decrease in morning erections.  He has not tried any medications previously for this.  He reports he was seen by his primary care provider and started on testosterone however has not picked up his prescription.  He states he took 1 injection every 2 weeks.  He is unsure if this helped with his erections.  He does endorse decreased sex drive and fatigue as well.  Does endorse a weak urinary stream and nocturia.      Past Medical History:  Past Medical History:   Diagnosis Date    Arthritis     Elevated cholesterol     Hyperestrogenism 6/14/2019    Hypertension        Current Meds:  Current Outpatient Medications   Medication Sig Dispense Refill    citalopram (CeleXA) 20 MG tablet Take 1 tablet by mouth Daily.      colchicine 0.6 MG tablet Take 1 tablet by mouth Daily.      febuxostat (ULORIC) 40 MG tablet Take 1 tablet by mouth Daily.      ibuprofen (ADVIL,MOTRIN) 800 MG tablet Take 1 tablet by mouth Every 6 (Six) Hours As Needed for Mild Pain.      lisinopril (PRINIVIL,ZESTRIL) 40 MG tablet Take 1 tablet by mouth Daily.      nabumetone (RELAFEN) 500 MG tablet Take 1 tablet by mouth 2 " (Two) Times a Day As Needed for Mild Pain.      Ozempic, 2 MG/DOSE, 8 MG/3ML solution pen-injector INJECT 2 MG SUBCUTANEOUSLY ONCE A WEEK      rosuvastatin (CRESTOR) 10 MG tablet Take 1 tablet by mouth Daily. 30 tablet 11    tadalafil (CIALIS) 5 MG tablet Take 1 tablet by mouth Daily. 30 tablet 2    Testosterone Cypionate (DEPOTESTOTERONE CYPIONATE) 200 MG/ML injection INJECT 1/2 (ONE-HALF) ML INTRAMUSCULARLY EVERY TWO WEEKS (Patient not taking: Reported on 10/29/2024)       No current facility-administered medications for this visit.        Allergies:   No Known Allergies     Past Surgical History:  Past Surgical History:   Procedure Laterality Date    COLONOSCOPY N/A 2/17/2022    Procedure: COLONOSCOPY;  Surgeon: Oswald Chan MD;  Location: [facility];  Service: Gastroenterology;  Laterality: N/A;    EXTRACORPOREAL SHOCK WAVE LITHOTRIPSY (ESWL)  2002       Social History:  Social History     Socioeconomic History    Marital status:    Tobacco Use    Smoking status: Never     Passive exposure: Never    Smokeless tobacco: Never   Vaping Use    Vaping status: Never Used   Substance and Sexual Activity    Alcohol use: Yes     Comment: Occasional    Drug use: No    Sexual activity: Yes     Partners: Female     Birth control/protection: None       Family History:  Family History   Problem Relation Age of Onset    No Known Problems Mother     Heart disease Father     Heart attack Maternal Grandfather     Heart disease Maternal Grandfather        Review of Systems:  Review of Systems   Constitutional:  Positive for fatigue. Negative for fever and unexpected weight change.   Respiratory:  Negative for chest tightness and shortness of breath.    Cardiovascular:  Negative for chest pain.   Gastrointestinal:  Negative for abdominal pain, constipation, diarrhea, nausea and vomiting.   Genitourinary:  Positive for difficulty urinating. Negative for dysuria, frequency, hematuria and urgency.        Decreased sex drive.   Difficulty with erections.   Skin:  Negative for rash.   Psychiatric/Behavioral:  Negative for confusion and suicidal ideas.        Physical Exam:  Physical Exam  Constitutional:       General: He is not in acute distress.     Appearance: Normal appearance.   HENT:      Head: Normocephalic and atraumatic.      Nose: Nose normal.      Mouth/Throat:      Mouth: Mucous membranes are moist.   Eyes:      Conjunctiva/sclera: Conjunctivae normal.   Cardiovascular:      Rate and Rhythm: Normal rate and regular rhythm.      Pulses: Normal pulses.      Heart sounds: Normal heart sounds.   Pulmonary:      Effort: Pulmonary effort is normal.      Breath sounds: Normal breath sounds.   Abdominal:      General: Bowel sounds are normal.      Palpations: Abdomen is soft.   Musculoskeletal:         General: Normal range of motion.      Cervical back: Normal range of motion.   Skin:     General: Skin is warm.   Neurological:      General: No focal deficit present.      Mental Status: He is alert and oriented to person, place, and time.   Psychiatric:         Mood and Affect: Mood normal.         Behavior: Behavior normal.         Thought Content: Thought content normal.         Judgment: Judgment normal.           Recent Image (CT and/or KUB):   CT Abdomen and Pelvis: No results found for this or any previous visit.     CT Stone Protocol: No results found for this or any previous visit.     KUB: No results found for this or any previous visit.       Labs:  Brief Urine Lab Results       None          Hospital Outpatient Visit on 08/22/2024   Component Date Value Ref Range Status    Nuclear Prior Study 08/22/2024 3.0   Final     CV REST NUCLEAR ISOTOPE DOSE 08/22/2024 9.9  mCi Final     CV STRESS NUCLEAR ISOTOPE DOSE 08/22/2024 30.0  mCi Final    Exercise duration (min) 08/22/2024 6  min Final    Estimated workload 08/22/2024 7.0  METS Final     CV STRESS PROTOCOL 1 08/22/2024 Jem   Final    Stage 1 08/22/2024 1.0   Final     HR Stage 1 08/22/2024 117   Final    BP Stage 1 08/22/2024 159/81   Final    Duration Min Stage 1 08/22/2024 3   Final    Duration Sec Stage 1 08/22/2024 0   Final    Grade Stage 1 08/22/2024 10   Final    Speed Stage 1 08/22/2024 1.7   Final    BH CV STRESS METS STAGE 1 08/22/2024 5.0   Final    Stage 2 08/22/2024 2.0   Final    HR Stage 2 08/22/2024 144   Final    BP Stage 2 08/22/2024 204/78   Final    Duration Min Stage 2 08/22/2024 3   Final    Duration Sec Stage 2 08/22/2024 0   Final    Grade Stage 2 08/22/2024 12   Final    Speed Stage 2 08/22/2024 2.5   Final    BH CV STRESS METS STAGE 2 08/22/2024 7.5   Final    Baseline HR 08/22/2024 96  bpm Final    Baseline BP 08/22/2024 129/94  mmHg Final    Peak HR 08/22/2024 144  bpm Final    Peak BP 08/22/2024 204/78  mmHg Final    Recovery HR 08/22/2024 99  bpm Final    Target HR (85%) 08/22/2024 142  bpm Final    Max. Pred. HR (100%) 08/22/2024 167  bpm Final    Percent Max Pred HR 08/22/2024 86.23  % Final    Percent Target HR 08/22/2024 101  % Final    Recovery BP 08/22/2024 145/92  mmHg Final    Nuc Stress EF 08/22/2024 53  % Final   Lab on 08/06/2024   Component Date Value Ref Range Status    Glucose 08/06/2024 89  65 - 99 mg/dL Final    BUN 08/06/2024 14  6 - 20 mg/dL Final    Creatinine 08/06/2024 0.97  0.76 - 1.27 mg/dL Final    Sodium 08/06/2024 138  136 - 145 mmol/L Final    Potassium 08/06/2024 4.4  3.5 - 5.2 mmol/L Final    Slight hemolysis detected by analyzer. Result may be falsely elevated.    Chloride 08/06/2024 106  98 - 107 mmol/L Final    CO2 08/06/2024 19.0 (L)  22.0 - 29.0 mmol/L Final    Calcium 08/06/2024 9.5  8.6 - 10.5 mg/dL Final    Total Protein 08/06/2024 7.2  6.0 - 8.5 g/dL Final    Albumin 08/06/2024 4.3  3.5 - 5.2 g/dL Final    ALT (SGPT) 08/06/2024 30  1 - 41 U/L Final    AST (SGOT) 08/06/2024 26  1 - 40 U/L Final    Slight hemolysis detected by analyzer. Result may be falsely elevated.    Alkaline Phosphatase 08/06/2024 71  39 - 117  U/L Final    Total Bilirubin 08/06/2024 0.3  0.0 - 1.2 mg/dL Final    Globulin 08/06/2024 2.9  gm/dL Final    A/G Ratio 08/06/2024 1.5  g/dL Final    BUN/Creatinine Ratio 08/06/2024 14.4  7.0 - 25.0 Final    Anion Gap 08/06/2024 13.0  5.0 - 15.0 mmol/L Final    eGFR 08/06/2024 93.3  >60.0 mL/min/1.73 Final    Total Cholesterol 08/06/2024 132  0 - 200 mg/dL Final    Triglycerides 08/06/2024 70  0 - 150 mg/dL Final    HDL Cholesterol 08/06/2024 37 (L)  40 - 60 mg/dL Final    LDL Cholesterol  08/06/2024 81  0 - 100 mg/dL Final    VLDL Cholesterol 08/06/2024 14  5 - 40 mg/dL Final    LDL/HDL Ratio 08/06/2024 2.19   Final        Procedure: None  Procedures     I have reviewed and agree with the above PMH, PSH, FMH, social history, medications, allergies, and labs.     Assessment/Plan:   Problem List Items Addressed This Visit       Low testosterone in male    Relevant Orders    Testosterone    CBC (No Diff)    Erectile dysfunction due to arterial insufficiency    Relevant Medications    tadalafil (CIALIS) 5 MG tablet     Other Visit Diagnoses       Benign prostatic hyperplasia with weak urinary stream    -  Primary    Relevant Medications    tadalafil (CIALIS) 5 MG tablet    Other Relevant Orders    PSA Diagnostic    Hyperestrogenism in male        Relevant Orders    Estradiol            Health Maintenance:   Health Maintenance Due   Topic Date Due    URINE MICROALBUMIN  Never done    Pneumococcal Vaccine 0-64 (1 of 2 - PCV) Never done    DIABETIC EYE EXAM  Never done    Hepatitis B (1 of 3 - 19+ 3-dose series) Never done    HEPATITIS C SCREENING  Never done    ANNUAL WELLNESS VISIT  Never done    DIABETIC FOOT EXAM  Never done    HEMOGLOBIN A1C  Never done    INFLUENZA VACCINE  Never done    COVID-19 Vaccine (2 - 2023-24 season) 09/01/2024        Smoking Counseling: Never smoked.  Never used smokeless tobacco.    Urine Incontinence: Patient reports that he is not currently experiencing any symptoms of urinary  incontinence.    Patient was given instructions and counseling regarding his condition or for health maintenance advice. Please see specific information pulled into the AVS if appropriate.    Patient Education:   Benign Prostate Hypertrophy (BPH): Discussed the pathophysiology of BPH and obstruction.  We discussed the static and dynamic effects of BPH as well as using 5 alpha reductase inhibitors versus alpha blockade.  We discussed the indications for transurethral surgery as well and/ or other therapeutic options available including all of the newer techniques.  Given patient's concurrent lower urinary tract symptoms and erectile dysfunction we will start him on tadalafil 5 mg once daily.  Discussed the risk and benefits of this medication in detail.  Discussed the risk of priapism advised him to discontinue medication if he begins experience lightheadedness, dizziness, blurry vision, chest pain, shortness of breath, syncope, painful erection, or prolonged erection.  Advised him to seek immediate medical attention if experiences priapism.  Vies him this is a medical emergency.  He verbalized understanding.  PSA testing - I am recommending a prostate specific antigen blood test or PSA.  I discussed the pathophysiology of PSA testing indicating its use in the diagnosis and management of prostate cancer.  I discussed the normal range being 0 to 4, but more appropriately being much closer to 0 to 2 in a normal male.  I discussed the fact that after a certain age it is against recommendation to use PSA testing especially in view of numerous comorbidities.  I discussed many of the things that can artificially raise PSA including put not limited to a recent infection, urinary tract infection, recent sexual intercourse, or even the type of movement such as manipulation of the prostate from riding a bicycle.  It was discussed that the most important use of PSA is the velocity measurement.  This refers to the change of PSA  with time. I discussed that we look for greater than 20% rise over a year to help us make the prediction of prostate cancer.  I also discussed that in the case of prostate cancer indicating a radical prostatectomy, the PSA should be 0 and any rise indicates an early biochemical recurrence.  I will call him with PSA results once available  Hyperestrogenism -patient does have a medical history of hyperestrogenism and I will recheck estradiol level in office today given concerns of wishing to restart testosterone.  Discussed the use of Arimidex and off label setting.  Patient verbalized understanding.  I will call him with lab results once available.  Erectile dysfunction -discussed with the patient the pathophysiology of erectile dysfunction.  I explained to the patient that erectile dysfunction is a symptom and no disorder.  I educated the patient that erectile dysfunction is often secondary to significant arterial insufficiency, diabetes mellitus, medications, trauma, low testosterone, or psychological factors.  I discussed with the patient ways to help treat erectile dysfunction which include but are not limited to medications, mechanical devices, penile injections, and penile implants.  Given patient's current symptoms we will start him on tadalafil 5 mg daily.  Discussed the risk and benefits of this in detail and he verbalized understanding.  Low testosterone -discussed with the patient the pathophysiology of low testosterone.  Discussed signs and symptoms which can include but not limited to erectile dysfunction, decreased libido, weight gain, fatigue, and mood changes.  Discussed appropriate workup including lab results.  Will check a CBC, estradiol, testosterone, PSA in office today.  Did advise patient testosterone is a controlled substance and requires routine visits as well as lab work.  Discussed the risk and benefits of testosterone placement therapy in detail as well.  I will call patient with lab  results once available we will see him back in 3 months or sooner if needed pending test results    Visit Diagnoses:    ICD-10-CM ICD-9-CM   1. Benign prostatic hyperplasia with weak urinary stream  N40.1 600.01    R39.12 788.62   2. Low testosterone in male  R79.89 790.99   3. Hyperestrogenism in male  E34.8 259.8   4. Erectile dysfunction due to arterial insufficiency  N52.01 607.84     A total of 45 minutes were spent coordinating this patient’s care in clinic today; 30 minutes of which were face-to-face with the patient, reviewing medical history and counseling on the current treatment and followup plan.  All questions were answered to patient's satisfaction.    Meds Ordered During Visit:  New Medications Ordered This Visit   Medications    tadalafil (CIALIS) 5 MG tablet     Sig: Take 1 tablet by mouth Daily.     Dispense:  30 tablet     Refill:  2       Follow Up Appointment: 3 months or sooner if needed  No follow-ups on file.      This document has been electronically signed by Gab Thomas PA-C   October 29, 2024 10:45 EDT    Part of this note may be an electronic transcription/translation of spoken language to printed text using the Dragon Dictation System.

## 2024-10-30 ENCOUNTER — TELEPHONE (OUTPATIENT)
Dept: UROLOGY | Facility: CLINIC | Age: 54
End: 2024-10-30
Payer: MEDICARE

## 2024-10-30 LAB
DEPRECATED RDW RBC AUTO: 43.1 FL (ref 37–54)
ERYTHROCYTE [DISTWIDTH] IN BLOOD BY AUTOMATED COUNT: 13.5 % (ref 12.3–15.4)
ESTRADIOL SERPL HS-MCNC: 30.5 PG/ML
HCT VFR BLD AUTO: 47 % (ref 37.5–51)
HGB BLD-MCNC: 16.1 G/DL (ref 13–17.7)
MCH RBC QN AUTO: 29.9 PG (ref 26.6–33)
MCHC RBC AUTO-ENTMCNC: 34.3 G/DL (ref 31.5–35.7)
MCV RBC AUTO: 87.4 FL (ref 79–97)
PLATELET # BLD AUTO: 229 10*3/MM3 (ref 140–450)
PMV BLD AUTO: 10 FL (ref 6–12)
PSA SERPL-MCNC: 0.94 NG/ML (ref 0–4)
RBC # BLD AUTO: 5.38 10*6/MM3 (ref 4.14–5.8)
TESTOST SERPL-MCNC: 248 NG/DL (ref 193–740)
WBC NRBC COR # BLD AUTO: 6.75 10*3/MM3 (ref 3.4–10.8)

## 2024-10-30 NOTE — TELEPHONE ENCOUNTER
Called patient advised him lab results were all within normal limits.  Recommend he continue tadalafil and call back with any questions or concerns.  He verbalized understanding.

## 2024-11-06 ENCOUNTER — OFFICE VISIT (OUTPATIENT)
Dept: PULMONOLOGY | Facility: CLINIC | Age: 54
End: 2024-11-06
Payer: MEDICARE

## 2024-11-06 VITALS
WEIGHT: 315 LBS | OXYGEN SATURATION: 97 % | HEART RATE: 78 BPM | SYSTOLIC BLOOD PRESSURE: 142 MMHG | HEIGHT: 72 IN | TEMPERATURE: 97.5 F | DIASTOLIC BLOOD PRESSURE: 78 MMHG | BODY MASS INDEX: 42.66 KG/M2

## 2024-11-06 DIAGNOSIS — G47.33 OBSTRUCTIVE SLEEP APNEA: Primary | ICD-10-CM

## 2024-11-06 DIAGNOSIS — E66.01 MORBID OBESITY WITH BMI OF 50.0-59.9, ADULT: ICD-10-CM

## 2024-11-06 NOTE — PROGRESS NOTES
Chief Complaint  Sleep Apnea    Daniele Phelps is a 54 y.o. male who presents today to [facility] for Sleep Apnea.    HPI:      Patient is a 54-year-old morbidly obese male with moderate obstructive sleep apnea, AHI of 23.  He has been on AutoPap.  Download showed excellent compliance.  Significant air leak noted.  Patient reported that it bothers him once in a while.    I also ordered a high-resolution CAT scan because of restrictive lung physiology on PFTs.  It did not show any airspace disease/interstitial lung disease.    Patient usually gets refreshing sleep.  She reported air leak around her mask.      He reported shortness of breath on walking short distances.    He denies joint stiffness, photosensitivity and rash.     He has history of severe gout.  His activity is limited due to leg pain as well as shortness of breath.    He denies claustrophobia, nasal congestion, but reported oral and nasal dryness.        He denies chest pain, orthopnea, PND, leg edema, nausea, vomiting, diarrhea, hemoptysis, hematemesis.       Previous History:   Past Medical History:   Diagnosis Date    Arthritis     Elevated cholesterol     Hyperestrogenism 6/14/2019    Hypertension       Past Surgical History:   Procedure Laterality Date    COLONOSCOPY N/A 2/17/2022    Procedure: COLONOSCOPY;  Surgeon: Oswald Chan MD;  Location: [facility];  Service: Gastroenterology;  Laterality: N/A;    EXTRACORPOREAL SHOCK WAVE LITHOTRIPSY (ESWL)  2002      Social History     Socioeconomic History    Marital status:    Tobacco Use    Smoking status: Never     Passive exposure: Never    Smokeless tobacco: Never   Vaping Use    Vaping status: Never Used   Substance and Sexual Activity    Alcohol use: Yes     Comment: Occasional    Drug use: No    Sexual activity: Yes     Partners: Female     Birth control/protection: None        Current Medications:  Current Outpatient Medications  "  Medication Sig Dispense Refill    citalopram (CeleXA) 20 MG tablet Take 1 tablet by mouth Daily.      colchicine 0.6 MG tablet Take 1 tablet by mouth Daily.      febuxostat (ULORIC) 40 MG tablet Take 1 tablet by mouth Daily.      ibuprofen (ADVIL,MOTRIN) 800 MG tablet Take 1 tablet by mouth Every 6 (Six) Hours As Needed for Mild Pain.      lisinopril (PRINIVIL,ZESTRIL) 40 MG tablet Take 1 tablet by mouth Daily.      nabumetone (RELAFEN) 500 MG tablet Take 1 tablet by mouth 2 (Two) Times a Day As Needed for Mild Pain.      Ozempic, 2 MG/DOSE, 8 MG/3ML solution pen-injector INJECT 2 MG SUBCUTANEOUSLY ONCE A WEEK      rosuvastatin (CRESTOR) 10 MG tablet Take 1 tablet by mouth Daily. 30 tablet 11    tadalafil (CIALIS) 5 MG tablet Take 1 tablet by mouth Daily. 30 tablet 2    Testosterone Cypionate (DEPOTESTOTERONE CYPIONATE) 200 MG/ML injection        No current facility-administered medications for this visit.       Allergies:   No Known Allergies    Vitals:   /78   Pulse 78   Temp 97.5 °F (36.4 °C)   Ht 182.9 cm (72.01\")   Wt (!) 170 kg (375 lb)   SpO2 97%   BMI 50.85 kg/m²     Estimated body mass index is 50.85 kg/m² as calculated from the following:    Height as of this encounter: 182.9 cm (72.01\").    Weight as of this encounter: 170 kg (375 lb).    Daniele Phelps  reports that he has never smoked. He has never been exposed to tobacco smoke. He has never used smokeless tobacco.            Physical Exam:   Physical Exam  Vitals reviewed.   Constitutional:       Appearance: Normal appearance. He is obese.      Interventions: He is not intubated.  HENT:      Head: Normocephalic.      Nose: Nose normal.      Mouth/Throat:      Mouth: Mucous membranes are moist.      Comments: Severe oropharyngeal crowding  Eyes:      Extraocular Movements: Extraocular movements intact.      Conjunctiva/sclera: Conjunctivae normal.      Pupils: Pupils are equal, round, and reactive to light.   Cardiovascular:      Rate and " Rhythm: Normal rate.      Heart sounds: No murmur heard.     No friction rub. No gallop.   Pulmonary:      Effort: Pulmonary effort is normal. No tachypnea, bradypnea, accessory muscle usage, prolonged expiration, respiratory distress or retractions. He is not intubated.      Breath sounds: Normal breath sounds. No stridor, decreased air movement or transmitted upper airway sounds. No wheezing or rales.   Abdominal:      General: There is no distension.      Palpations: Abdomen is soft. There is no mass.      Tenderness: There is no abdominal tenderness. There is no right CVA tenderness, left CVA tenderness, guarding or rebound.      Hernia: No hernia is present.   Skin:     Coloration: Skin is not jaundiced.      Findings: No erythema.   Neurological:      General: No focal deficit present.      Mental Status: He is alert and oriented to person, place, and time.   Psychiatric:         Mood and Affect: Mood normal.          Procedures     STOP-Bang Score:     [place] Sleepiness Scale:       Lab Results:   Office Visit on 10/29/2024   Component Date Value Ref Range Status    PSA 10/29/2024 0.937  0.000 - 4.000 ng/mL Final    Testosterone, Total 10/29/2024 248.00  193.00 - 740.00 ng/dL Final    WBC 10/29/2024 6.75  3.40 - 10.80 10*3/mm3 Final    RBC 10/29/2024 5.38  4.14 - 5.80 10*6/mm3 Final    Hemoglobin 10/29/2024 16.1  13.0 - 17.7 g/dL Final    Hematocrit 10/29/2024 47.0  37.5 - 51.0 % Final    MCV 10/29/2024 87.4  79.0 - 97.0 fL Final    MCH 10/29/2024 29.9  26.6 - 33.0 pg Final    MCHC 10/29/2024 34.3  31.5 - 35.7 g/dL Final    RDW 10/29/2024 13.5  12.3 - 15.4 % Final    RDW-SD 10/29/2024 43.1  37.0 - 54.0 fl Final    MPV 10/29/2024 10.0  6.0 - 12.0 fL Final    Platelets 10/29/2024 229  140 - 450 10*3/mm3 Final    Estradiol 10/29/2024 30.5  pg/mL Final   Hospital Outpatient Visit on 08/22/2024   Component Date Value Ref Range Status    Nuclear Prior Study 08/22/2024 3.0   Final    BH CV REST NUCLEAR ISOTOPE DOSE  "08/22/2024 9.9  mCi Final    BH CV STRESS NUCLEAR ISOTOPE DOSE 08/22/2024 30.0  mCi Final    Exercise duration (min) 08/22/2024 6  min Final    Estimated workload 08/22/2024 7.0  METS Final    BH CV STRESS PROTOCOL 1 08/22/2024 Jem   Final    Stage 1 08/22/2024 1.0   Final    HR Stage 1 08/22/2024 117   Final    BP Stage 1 08/22/2024 159/81   Final    Duration Min Stage 1 08/22/2024 3   Final    Duration Sec Stage 1 08/22/2024 0   Final    Grade Stage 1 08/22/2024 10   Final    Speed Stage 1 08/22/2024 1.7   Final    BH CV STRESS METS STAGE 1 08/22/2024 5.0   Final    Stage 2 08/22/2024 2.0   Final    HR Stage 2 08/22/2024 144   Final    BP Stage 2 08/22/2024 204/78   Final    Duration Min Stage 2 08/22/2024 3   Final    Duration Sec Stage 2 08/22/2024 0   Final    Grade Stage 2 08/22/2024 12   Final    Speed Stage 2 08/22/2024 2.5   Final    BH CV STRESS METS STAGE 2 08/22/2024 7.5   Final    Baseline HR 08/22/2024 96  bpm Final    Baseline BP 08/22/2024 129/94  mmHg Final    Peak HR 08/22/2024 144  bpm Final    Peak BP 08/22/2024 204/78  mmHg Final    Recovery HR 08/22/2024 99  bpm Final    Target HR (85%) 08/22/2024 142  bpm Final    Max. Pred. HR (100%) 08/22/2024 167  bpm Final    Percent Max Pred HR 08/22/2024 86.23  % Final    Percent Target HR 08/22/2024 101  % Final    Recovery BP 08/22/2024 145/92  mmHg Final    Nuc Stress EF 08/22/2024 53  % Final       Lab Results (last 72 hours)       ** No results found for the last 72 hours. **          WBC No results found for: \"WBC\"   HGB No results found for: \"HGB\"   HCT No results found for: \"HCT\"   Platlets No results found for: \"LABPLAT\"     PT/INR:  No results found for: \"PROTIME\"/No results found for: \"INR\"    Sodium No results found for: \"NA\"   Potassium No results found for: \"K\"   Chloride No results found for: \"CL\"   Bicarbonate No results found for: \"PLASMABICARB\"   BUN No results found for: \"BUN\"   Creatinine No results found for: \"CREATININE\"   Calcium " "No results found for: \"CALCIUM\"   Magnesium No results found for: \"MG\"     pH No results found for: \"PHART\"   pO2 No results found for: \"PO2ART\"   pCO2 No results found for: \"DZJ8LSU\"   HCO3 No results found for: \"YHA2AIJ\"           Radiology Review:   No results found for this or any previous visit.     No results found for this or any previous visit.    No results found for this or any previous visit.    No results found for this or any previous visit.    No results found for this or any previous visit.    No results found for this or any previous visit.     No results found for this or any previous visit.    No results found for this or any previous visit.    No results found for this or any previous visit.                  Results Review: I reviewed the patient's new clinical results.    Assessment and Plan    Visit Diagnosis:     ICD-10-CM ICD-9-CM   1. Obstructive sleep apnea  G47.33 327.23   2. Morbid obesity with BMI of 50.0-59.9, adult  E66.01 278.01    Z68.43 V85.43      No evidence of interstitial lung disease.  Active lung physiology is most likely due to truncal obesity.  Counseling was done on weight reduction.  Consequences of untreated sleep apnea including hypertension, increased risk of coronary artery disease, congestive heart failure, arrhythmias, depression, dementia, stroke discussed in detail.  Patient is willing to lose weight.  Advised him to avoid alcoholic  beverages/CNS suppressant drugs.    Return to clinic in 1 year, earlier as needed  New Medications:   No orders of the defined types were placed in this encounter.      Discontinued Medications:   There are no discontinued medications.         Follow Up:       Patient was given instructions and counseling regarding his condition. Please see specific information pulled into the AVS if appropriate.       This document has been electronically signed by Titi Metcalf MD   December 20, 2024 11:27 EST    Dictated Utilizing Dragon Dictation: " Part of this note may be an electronic transcription/translation of spoken language to printed text using the Dragon Dictation System.

## 2024-11-06 NOTE — PROGRESS NOTES
Interval history since last visit:    Recent hospitalizations:    Investigations (imaging, PFT's, labs, sleep study, record requests, etc.)    Have you had the Influenza Vaccine? {yes or no:83402}   Would you like to receive this Vaccine today? {yes or no:96955}    Have you had the Pneumonia Vaccine?  {yes or no:30997}  Would you like to receive this Vaccine today? {yes or no:79371}    Subjective    Daniele Phelps presents for the following Sleep Apnea  .    History of Present Illness     Review of Systems    Active Problems:  Problem List Items Addressed This Visit    None      Past Medical History:  Past Medical History:   Diagnosis Date    Arthritis     Elevated cholesterol     Hyperestrogenism 6/14/2019    Hypertension        Family History:  Family History   Problem Relation Age of Onset    No Known Problems Mother     Heart disease Father     Heart attack Maternal Grandfather     Heart disease Maternal Grandfather        Social History:  Social History     Tobacco Use    Smoking status: Never     Passive exposure: Never    Smokeless tobacco: Never   Substance Use Topics    Alcohol use: Yes     Comment: Occasional       Current Medications:  Current Outpatient Medications   Medication Sig Dispense Refill    citalopram (CeleXA) 20 MG tablet Take 1 tablet by mouth Daily.      colchicine 0.6 MG tablet Take 1 tablet by mouth Daily.      febuxostat (ULORIC) 40 MG tablet Take 1 tablet by mouth Daily.      ibuprofen (ADVIL,MOTRIN) 800 MG tablet Take 1 tablet by mouth Every 6 (Six) Hours As Needed for Mild Pain.      lisinopril (PRINIVIL,ZESTRIL) 40 MG tablet Take 1 tablet by mouth Daily.      nabumetone (RELAFEN) 500 MG tablet Take 1 tablet by mouth 2 (Two) Times a Day As Needed for Mild Pain.      Ozempic, 2 MG/DOSE, 8 MG/3ML solution pen-injector INJECT 2 MG SUBCUTANEOUSLY ONCE A WEEK      rosuvastatin (CRESTOR) 10 MG tablet Take 1 tablet by mouth Daily. 30 tablet 11    tadalafil (CIALIS) 5 MG tablet Take 1 tablet  "by mouth Daily. 30 tablet 2    Testosterone Cypionate (DEPOTESTOTERONE CYPIONATE) 200 MG/ML injection        No current facility-administered medications for this visit.       Allergies:  No Known Allergies    Vitals:  /78   Pulse 78   Temp 97.5 °F (36.4 °C)   Ht 182.9 cm (72.01\")   Wt (!) 170 kg (375 lb)   SpO2 97%   BMI 50.85 kg/m²     Imaging:    Imaging Results (Most Recent)       None            Pulmonary Functions Testing Results:    No results found for: \"FEV1\", \"FVC\", \"DUJ7KJC\", \"TLC\", \"DLCO\"    Results for orders placed or performed in visit on 10/29/24   PSA Diagnostic    Collection Time: 10/29/24 10:31 AM    Specimen: Arm, Right; Blood   Result Value Ref Range    PSA 0.937 0.000 - 4.000 ng/mL   Testosterone    Collection Time: 10/29/24 10:31 AM    Specimen: Arm, Right; Blood   Result Value Ref Range    Testosterone, Total 248.00 193.00 - 740.00 ng/dL   CBC (No Diff)    Collection Time: 10/29/24 10:31 AM    Specimen: Arm, Right; Blood   Result Value Ref Range    WBC 6.75 3.40 - 10.80 10*3/mm3    RBC 5.38 4.14 - 5.80 10*6/mm3    Hemoglobin 16.1 13.0 - 17.7 g/dL    Hematocrit 47.0 37.5 - 51.0 %    MCV 87.4 79.0 - 97.0 fL    MCH 29.9 26.6 - 33.0 pg    MCHC 34.3 31.5 - 35.7 g/dL    RDW 13.5 12.3 - 15.4 %    RDW-SD 43.1 37.0 - 54.0 fl    MPV 10.0 6.0 - 12.0 fL    Platelets 229 140 - 450 10*3/mm3   Estradiol    Collection Time: 10/29/24 10:31 AM    Specimen: Arm, Right; Blood   Result Value Ref Range    Estradiol 30.5 pg/mL       Objective   Physical Exam    Assessment & Plan     No diagnosis found.    No follow-ups on file.          "

## 2024-12-02 ENCOUNTER — TELEPHONE (OUTPATIENT)
Dept: PULMONOLOGY | Facility: CLINIC | Age: 54
End: 2024-12-02
Payer: MEDICARE

## 2024-12-02 NOTE — TELEPHONE ENCOUNTER
CT scan of the chest did not show any evidence of interstitial lung disease     Spoke with patient to go over test results per . Patient voiced understanding     BG

## 2025-01-27 DIAGNOSIS — N40.1 BENIGN PROSTATIC HYPERPLASIA WITH WEAK URINARY STREAM: ICD-10-CM

## 2025-01-27 DIAGNOSIS — N52.01 ERECTILE DYSFUNCTION DUE TO ARTERIAL INSUFFICIENCY: ICD-10-CM

## 2025-01-27 DIAGNOSIS — R39.12 BENIGN PROSTATIC HYPERPLASIA WITH WEAK URINARY STREAM: ICD-10-CM

## 2025-01-27 RX ORDER — TADALAFIL 5 MG/1
5 TABLET ORAL DAILY
Qty: 90 TABLET | Refills: 0 | Status: SHIPPED | OUTPATIENT
Start: 2025-01-27

## 2025-04-18 ENCOUNTER — TELEPHONE (OUTPATIENT)
Dept: UROLOGY | Facility: CLINIC | Age: 55
End: 2025-04-18
Payer: MEDICARE

## 2025-04-18 DIAGNOSIS — N40.1 BENIGN PROSTATIC HYPERPLASIA WITH WEAK URINARY STREAM: ICD-10-CM

## 2025-04-18 DIAGNOSIS — N52.01 ERECTILE DYSFUNCTION DUE TO ARTERIAL INSUFFICIENCY: ICD-10-CM

## 2025-04-18 DIAGNOSIS — R39.12 BENIGN PROSTATIC HYPERPLASIA WITH WEAK URINARY STREAM: ICD-10-CM

## 2025-04-18 RX ORDER — TADALAFIL 5 MG/1
5 TABLET ORAL DAILY
Qty: 90 TABLET | Refills: 1 | Status: SHIPPED | OUTPATIENT
Start: 2025-04-18

## 2025-04-18 RX ORDER — TADALAFIL 10 MG/1
10 TABLET ORAL AS NEEDED
Qty: 20 TABLET | Refills: 1 | Status: SHIPPED | OUTPATIENT
Start: 2025-04-18

## 2025-04-18 NOTE — TELEPHONE ENCOUNTER
Provider: PEDRO HUBER    Caller: katlin rubio    Relationship to Patient: Emergency Contact    Pharmacy: [place] [id number]     Phone Number: [phone]    Reason for Call: PT CALLED AND HAS REQUESTED AN INCREASE IN DOSAGE FOR MEDICATION CIALIS.    PLEASE CALL TO CONFIRM MEDS HAVE BEEN SENT TO PHARMACY. CAROLANN VASQUEZM.

## 2025-04-18 NOTE — TELEPHONE ENCOUNTER
Call patient advise him I will resend a refill of his tadalafil 5 then daily and I increased to 10 mg on demand.  He verbalized understanding.

## 2025-06-02 DIAGNOSIS — N52.01 ERECTILE DYSFUNCTION DUE TO ARTERIAL INSUFFICIENCY: ICD-10-CM

## 2025-06-02 RX ORDER — TADALAFIL 10 MG/1
10 TABLET ORAL AS NEEDED
Qty: 20 TABLET | Refills: 0 | Status: SHIPPED | OUTPATIENT
Start: 2025-06-02

## 2025-07-02 DIAGNOSIS — N52.01 ERECTILE DYSFUNCTION DUE TO ARTERIAL INSUFFICIENCY: ICD-10-CM

## 2025-07-02 RX ORDER — TADALAFIL 10 MG/1
10 TABLET ORAL AS NEEDED
Qty: 20 TABLET | Refills: 0 | Status: SHIPPED | OUTPATIENT
Start: 2025-07-02

## 2025-08-05 RX ORDER — ROSUVASTATIN CALCIUM 10 MG/1
10 TABLET, COATED ORAL DAILY
Qty: 90 TABLET | Refills: 0 | Status: SHIPPED | OUTPATIENT
Start: 2025-08-05

## 2025-08-17 DIAGNOSIS — N52.01 ERECTILE DYSFUNCTION DUE TO ARTERIAL INSUFFICIENCY: ICD-10-CM

## 2025-08-18 RX ORDER — TADALAFIL 10 MG/1
10 TABLET ORAL AS NEEDED
Qty: 20 TABLET | Refills: 0 | Status: SHIPPED | OUTPATIENT
Start: 2025-08-18

## (undated) DEVICE — PAD E/S GRND SGL/FOIL 9FT/CORD DISP

## (undated) DEVICE — POLYP TRAP: Brand: TRAPEASE®

## (undated) DEVICE — TUBING, SUCTION, 1/4" X 20', STRAIGHT: Brand: MEDLINE INDUSTRIES, INC.

## (undated) DEVICE — ENDOGATOR TUBING FOR ENDOGATOR EGP-100 IRRIGATION PUMP,OLYMPUS OFP PUMP, OLYMPUS AFU-100 PUMP AND ERBE EIP2 PUMP: Brand: ENDOGATOR

## (undated) DEVICE — Device

## (undated) DEVICE — SYR LUERLOK 30CC

## (undated) DEVICE — CONN Y IRR DISP 1P/U

## (undated) DEVICE — ENDOGATOR AUXILIARY WATER JET CONNECTOR: Brand: ENDOGATOR

## (undated) DEVICE — Device: Brand: DEFENDO AIR/WATER/SUCTION AND BIOPSY VALVE